# Patient Record
Sex: MALE | Race: WHITE | Employment: OTHER | URBAN - METROPOLITAN AREA
[De-identification: names, ages, dates, MRNs, and addresses within clinical notes are randomized per-mention and may not be internally consistent; named-entity substitution may affect disease eponyms.]

---

## 2018-03-06 PROBLEM — M25.461 EFFUSION OF RIGHT KNEE: Status: ACTIVE | Noted: 2018-03-06

## 2018-07-25 ENCOUNTER — HOSPITAL ENCOUNTER (OUTPATIENT)
Age: 53
Discharge: HOME | End: 2018-07-27
Payer: COMMERCIAL

## 2018-07-25 ENCOUNTER — OFFICE VISIT (OUTPATIENT)
Dept: ORTHOPEDIC SURGERY | Age: 53
End: 2018-07-25
Payer: COMMERCIAL

## 2018-07-25 VITALS
BODY MASS INDEX: 28.49 KG/M2 | HEART RATE: 91 BPM | SYSTOLIC BLOOD PRESSURE: 132 MMHG | TEMPERATURE: 98.6 F | HEIGHT: 73 IN | WEIGHT: 215 LBS | DIASTOLIC BLOOD PRESSURE: 89 MMHG

## 2018-07-25 DIAGNOSIS — M25.461 EFFUSION OF RIGHT KNEE: ICD-10-CM

## 2018-07-25 DIAGNOSIS — G89.29 CHRONIC PAIN OF LEFT KNEE: Primary | ICD-10-CM

## 2018-07-25 DIAGNOSIS — M25.462 EFFUSION OF LEFT KNEE JOINT: ICD-10-CM

## 2018-07-25 DIAGNOSIS — M25.562 CHRONIC PAIN OF LEFT KNEE: Primary | ICD-10-CM

## 2018-07-25 LAB
APPEARANCE FLUID: NORMAL
COLOR FLUID: YELLOW
MONOCYTE, FLUID: 88 %
NEUTROPHIL, FLUID: 12 %
NUCLEATED CELLS FLUID: 3291 /UL
RBC FLUID: <2000 /UL

## 2018-07-25 PROCEDURE — 89060 EXAM SYNOVIAL FLUID CRYSTALS: CPT

## 2018-07-25 PROCEDURE — G8419 CALC BMI OUT NRM PARAM NOF/U: HCPCS | Performed by: ORTHOPAEDIC SURGERY

## 2018-07-25 PROCEDURE — 99213 OFFICE O/P EST LOW 20 MIN: CPT | Performed by: ORTHOPAEDIC SURGERY

## 2018-07-25 PROCEDURE — 89051 BODY FLUID CELL COUNT: CPT

## 2018-07-25 PROCEDURE — G8427 DOCREV CUR MEDS BY ELIG CLIN: HCPCS | Performed by: ORTHOPAEDIC SURGERY

## 2018-07-25 PROCEDURE — 1036F TOBACCO NON-USER: CPT | Performed by: ORTHOPAEDIC SURGERY

## 2018-07-25 PROCEDURE — 3017F COLORECTAL CA SCREEN DOC REV: CPT | Performed by: ORTHOPAEDIC SURGERY

## 2018-07-25 PROCEDURE — 20610 DRAIN/INJ JOINT/BURSA W/O US: CPT | Performed by: ORTHOPAEDIC SURGERY

## 2018-07-25 RX ORDER — TRIAMCINOLONE ACETONIDE 40 MG/ML
80 INJECTION, SUSPENSION INTRA-ARTICULAR; INTRAMUSCULAR ONCE
Status: COMPLETED | OUTPATIENT
Start: 2018-07-25 | End: 2018-07-25

## 2018-07-25 RX ORDER — BUPIVACAINE HYDROCHLORIDE 2.5 MG/ML
3 INJECTION, SOLUTION INFILTRATION; PERINEURAL ONCE
Status: COMPLETED | OUTPATIENT
Start: 2018-07-25 | End: 2018-07-25

## 2018-07-25 RX ADMIN — BUPIVACAINE HYDROCHLORIDE 7.5 MG: 2.5 INJECTION, SOLUTION INFILTRATION; PERINEURAL at 15:36

## 2018-07-25 RX ADMIN — TRIAMCINOLONE ACETONIDE 80 MG: 40 INJECTION, SUSPENSION INTRA-ARTICULAR; INTRAMUSCULAR at 15:36

## 2018-07-26 LAB
CRYSTALS, FLUID: NORMAL
PATH CONSULT FLUID: YES
SOURCE BODY FLUID: NORMAL

## 2018-07-26 NOTE — PROGRESS NOTES
Chief Complaint:   Chief Complaint   Patient presents with    Knee Problem      RJB for TSBc/o Lt knee pain and swelling, reaccumulation of fluid x 2 weeks. Is in golf outing this weekend. Last drained 3/6/2018       HPI 78-year-old man with history of recurring knee effusions more recently on the right knee. Followed by Dr. Lexy Kasper. Aspirations of never demonstrated sepsis. There was question of gout or other inflammatory arthropathy. Patient called today stating he had left knee pain and swelling. Could not wait until tomorrow to see Dr. Chel Aparicio, since he has a golf tournament planned. No recent injury no fever or systemic illness. Patient Active Problem List   Diagnosis    Gout of both knees    Effusion of right knee    Effusion of left knee joint       Past Medical History:   Diagnosis Date    Asthma     seasonal     Gout     Gout     Hyperlipidemia        Past Surgical History:   Procedure Laterality Date    COLONOSCOPY         Current Outpatient Prescriptions   Medication Sig Dispense Refill    atorvastatin (LIPITOR) 80 MG tablet Take 80 mg by mouth daily       fluticasone-salmeterol (ADVAIR) 100-50 MCG/DOSE diskus inhaler Inhale 1 puff into the lungs daily      fluticasone (FLONASE) 50 MCG/ACT nasal spray 1 spray as needed      ibuprofen (ADVIL;MOTRIN) 200 MG tablet Take 200 mg by mouth as needed      ketoconazole (NIZORAL) 2 % shampoo Apply topically daily      predniSONE (DELTASONE) 10 MG tablet 10 mg      LORazepam (ATIVAN) 0.5 MG tablet Take 0.5 mg by mouth as needed  0    allopurinol (ZYLOPRIM) 300 MG tablet Take 300 mg by mouth daily.  aspirin 81 MG EC tablet Take 81 mg by mouth daily.       celecoxib (CELEBREX) 200 MG capsule Take 1 capsule by mouth 2 times daily 60 capsule 2    predniSONE (DELTASONE) 5 MG tablet Take 1 tablet by mouth daily      indomethacin (INDOCIN) 25 MG capsule Take 25 mg by mouth 2 times daily (with meals)      HYDROcodone-acetaminophen (NORCO) 5-325 MG per tablet Take 1 tablet by mouth every 6 hours as needed for Pain      colchicine (COLCRYS) 0.6 MG tablet Take 0.6 mg by mouth as needed      omeprazole (PRILOSEC) 10 MG capsule Take 10 mg by mouth daily.  indomethacin (INDOCIN) 50 MG capsule Take 1 capsule by mouth 3 times daily for 30 doses. 30 capsule 0    HYDROcodone-acetaminophen (CO-GESIC) 5-500 MG per tablet Take 1 tablet by mouth every 6 hours as needed for Pain for 7 doses. 7 tablet 0     No current facility-administered medications for this visit. No Known Allergies    Social History     Social History    Marital status:      Spouse name: N/A    Number of children: N/A    Years of education: N/A     Social History Main Topics    Smoking status: Former Smoker     Quit date: 1984    Smokeless tobacco: Never Used    Alcohol use Yes      Comment: social    Drug use: No    Sexual activity: Not Asked     Other Topics Concern    None     Social History Narrative    None       Family History   Problem Relation Age of Onset    Heart Disease Father 50        had rheumatic fever as child ACB     Cancer Sister 48         breast [place]         Review of Systems   No fever, chills, or other constitutional symptoms. No numbness or other neuro symptoms. Ortho Exam left knee moderate effusion with mild diffuse tenderness no erythema. Patient is not systemically ill. Left knee range of motion is full. There is no pain with left hip rotation. Physical Exam    Patient is alert and oriented. Well-developed well-nourished. Pupils equal and reactive. Sclerae anicteric. Neck supple  Lungs clear. Cardiac rate and rhythm regular. Abdomen soft and nontender. Skin warm and dry. XRAY: deferred                    ASSESSMENT/PLAN:    Brent Deleon was seen today for knee problem.     Diagnoses and all orders for this visit:    Chronic pain of left knee    Effusion of right knee    Effusion of left knee joint  -     MT

## 2019-07-10 ENCOUNTER — OFFICE VISIT (OUTPATIENT)
Dept: ORTHOPEDIC SURGERY | Age: 54
End: 2019-07-10
Payer: COMMERCIAL

## 2019-07-10 VITALS
SYSTOLIC BLOOD PRESSURE: 137 MMHG | HEIGHT: 73 IN | TEMPERATURE: 97.5 F | HEART RATE: 61 BPM | BODY MASS INDEX: 29.16 KG/M2 | WEIGHT: 220 LBS | DIASTOLIC BLOOD PRESSURE: 88 MMHG

## 2019-07-10 DIAGNOSIS — M25.512 LEFT SHOULDER PAIN, UNSPECIFIED CHRONICITY: Primary | ICD-10-CM

## 2019-07-10 PROCEDURE — 99213 OFFICE O/P EST LOW 20 MIN: CPT | Performed by: ORTHOPAEDIC SURGERY

## 2019-07-10 PROCEDURE — 1036F TOBACCO NON-USER: CPT | Performed by: ORTHOPAEDIC SURGERY

## 2019-07-10 PROCEDURE — 3017F COLORECTAL CA SCREEN DOC REV: CPT | Performed by: ORTHOPAEDIC SURGERY

## 2019-07-10 PROCEDURE — G8427 DOCREV CUR MEDS BY ELIG CLIN: HCPCS | Performed by: ORTHOPAEDIC SURGERY

## 2019-07-10 PROCEDURE — G8419 CALC BMI OUT NRM PARAM NOF/U: HCPCS | Performed by: ORTHOPAEDIC SURGERY

## 2019-07-10 RX ORDER — APREMILAST 30 MG/1
TABLET, FILM COATED ORAL 2 TIMES DAILY
COMMUNITY
Start: 2019-06-23

## 2019-07-16 ENCOUNTER — OFFICE VISIT (OUTPATIENT)
Dept: RHEUMATOLOGY | Age: 54
End: 2019-07-16
Payer: COMMERCIAL

## 2019-07-16 VITALS
DIASTOLIC BLOOD PRESSURE: 84 MMHG | BODY MASS INDEX: 29.1 KG/M2 | OXYGEN SATURATION: 98 % | WEIGHT: 220.6 LBS | SYSTOLIC BLOOD PRESSURE: 138 MMHG | TEMPERATURE: 97.8 F | HEART RATE: 98 BPM

## 2019-07-16 DIAGNOSIS — M1A.09X0 IDIOPATHIC CHRONIC GOUT OF MULTIPLE SITES WITHOUT TOPHUS: ICD-10-CM

## 2019-07-16 DIAGNOSIS — L40.50 PSA (PSORIATIC ARTHRITIS) (HCC): ICD-10-CM

## 2019-07-16 PROCEDURE — 99214 OFFICE O/P EST MOD 30 MIN: CPT | Performed by: INTERNAL MEDICINE

## 2019-07-16 SDOH — HEALTH STABILITY: MENTAL HEALTH: HOW MANY STANDARD DRINKS CONTAINING ALCOHOL DO YOU HAVE ON A TYPICAL DAY?: 1 OR 2

## 2019-07-16 SDOH — HEALTH STABILITY: MENTAL HEALTH: HOW OFTEN DO YOU HAVE A DRINK CONTAINING ALCOHOL?: MONTHLY OR LESS

## 2019-07-16 ASSESSMENT — ENCOUNTER SYMPTOMS
ABDOMINAL PAIN: 0
EYE PAIN: 0
EYE DISCHARGE: 0
DIARRHEA: 0
PHOTOPHOBIA: 0
ABDOMINAL DISTENTION: 0
WHEEZING: 0
SORE THROAT: 0
BLOOD IN STOOL: 0
COUGH: 0
COLOR CHANGE: 0
SHORTNESS OF BREATH: 0
CONSTIPATION: 0
TROUBLE SWALLOWING: 0
SINUS PRESSURE: 0
CHEST TIGHTNESS: 0
SINUS PAIN: 0
EYE REDNESS: 0
EYE ITCHING: 0
VOMITING: 0

## 2019-07-16 NOTE — PROGRESS NOTES
heat intolerance, polydipsia, polyphagia and polyuria. Genitourinary: Negative for dysuria, flank pain, genital sores and hematuria. Musculoskeletal: Positive for arthralgias and joint swelling. Negative for myalgias and neck pain. Skin: Positive for rash. Negative for color change, pallor and wound. Allergic/Immunologic: Negative for environmental allergies and food allergies. Neurological: Negative for dizziness, seizures, syncope, weakness, light-headedness, numbness and headaches. Hematological: Negative for adenopathy. Does not bruise/bleed easily. Psychiatric/Behavioral: Negative for confusion. The patient is not nervous/anxious. Current Outpatient Medications:     adalimumab (HUMIRA PEN) 40 MG/0.8ML injection, 40 mg sub Inject every other week -citrate, Disp: 1 each, Rfl: 0    OTEZLA 30 MG TABS, Take by mouth 2 times daily , Disp: , Rfl:     atorvastatin (LIPITOR) 80 MG tablet, Take 80 mg by mouth daily , Disp: , Rfl:     fluticasone-salmeterol (ADVAIR) 100-50 MCG/DOSE diskus inhaler, Inhale 1 puff into the lungs as needed , Disp: , Rfl:     fluticasone (FLONASE) 50 MCG/ACT nasal spray, 1 spray as needed, Disp: , Rfl:     ibuprofen (ADVIL;MOTRIN) 200 MG tablet, Take 200 mg by mouth as needed, Disp: , Rfl:     ketoconazole (NIZORAL) 2 % shampoo, Apply topically daily, Disp: , Rfl:     predniSONE (DELTASONE) 10 MG tablet, 10 mg, Disp: , Rfl:     LORazepam (ATIVAN) 0.5 MG tablet, Take 0.5 mg by mouth as needed, Disp: , Rfl: 0    allopurinol (ZYLOPRIM) 300 MG tablet, Take 300 mg by mouth daily. , Disp: , Rfl:     aspirin 81 MG EC tablet, Take 81 mg by mouth daily. , Disp: , Rfl:     omeprazole (PRILOSEC) 10 MG capsule, Take 10 mg by mouth daily. , Disp: , Rfl:     indomethacin (INDOCIN) 50 MG capsule, Take 1 capsule by mouth 3 times daily for 30 doses. , Disp: 30 capsule, Rfl: 0  No Known Allergies        Past Medical History:   Diagnosis Date    Asthma     seasonal     Gout History Narrative    Not on file        Vitals:    07/16/19 1305   BP: 138/84   Site: Left Upper Arm   Position: Sitting   Pulse: 98   Temp: 97.8 °F (36.6 °C)   TempSrc: Temporal   SpO2: 98%   Weight: 220 lb 9.6 oz (100.1 kg)        Physical Exam   Constitutional: He is oriented to person, place, and time. He appears well-developed and well-nourished. HENT:   Head: Normocephalic. Right Ear: External ear normal.   Left Ear: External ear normal.   Mouth/Throat: Oropharynx is clear and moist.   Eyes: Pupils are equal, round, and reactive to light. Conjunctivae and EOM are normal.   Neck: Normal range of motion. No thyromegaly present. Cardiovascular: Normal rate, regular rhythm and intact distal pulses. Pulmonary/Chest: Effort normal and breath sounds normal. He has no wheezes. He has no rales. He exhibits no tenderness. Abdominal: Soft. Bowel sounds are normal. He exhibits no distension and no mass. There is no tenderness. There is no rebound and no guarding. Musculoskeletal: Normal range of motion. TTP left wrist   TTP most of MCP   Lymphadenopathy:     He has no cervical adenopathy. Neurological: He is alert and oriented to person, place, and time. Skin: Skin is warm and dry. Capillary refill takes less than 2 seconds. No rash noted. Nail pitting   Elevation of nail plates  Scaly rash over bilateral; shin. Psychiatric: He has a normal mood and affect. His behavior is normal.        Rey Mejía was seen today for joint pain, joint swelling and discuss medications. Diagnoses and all orders for this visit:    PSA (psoriatic arthritis) ([facility] [address]  Arthropathic psoriasis, unspecified  Comments : PsA manifested as oligoarthritis, nail pitting, along with worsening of Psoriatic patches. Significant improvement of symptoms since he is on [place]. At this point he has mild swelling in left knee and pain in left wrist and right shoulder. Today I discussed about Humira.  Reviewed side effects including but not

## 2019-07-23 ENCOUNTER — TELEPHONE (OUTPATIENT)
Dept: RHEUMATOLOGY | Age: 54
End: 2019-07-23

## 2019-07-30 ENCOUNTER — OFFICE VISIT (OUTPATIENT)
Dept: ORTHOPEDIC SURGERY | Age: 54
End: 2019-07-30
Payer: COMMERCIAL

## 2019-07-30 VITALS
BODY MASS INDEX: 29.16 KG/M2 | SYSTOLIC BLOOD PRESSURE: 122 MMHG | HEART RATE: 63 BPM | HEIGHT: 73 IN | DIASTOLIC BLOOD PRESSURE: 77 MMHG | TEMPERATURE: 98.4 F | WEIGHT: 220 LBS

## 2019-07-30 DIAGNOSIS — M25.512 LEFT SHOULDER PAIN, UNSPECIFIED CHRONICITY: Primary | ICD-10-CM

## 2019-07-30 PROCEDURE — 1036F TOBACCO NON-USER: CPT | Performed by: ORTHOPAEDIC SURGERY

## 2019-07-30 PROCEDURE — G8427 DOCREV CUR MEDS BY ELIG CLIN: HCPCS | Performed by: ORTHOPAEDIC SURGERY

## 2019-07-30 PROCEDURE — 20610 DRAIN/INJ JOINT/BURSA W/O US: CPT | Performed by: ORTHOPAEDIC SURGERY

## 2019-07-30 PROCEDURE — G8419 CALC BMI OUT NRM PARAM NOF/U: HCPCS | Performed by: ORTHOPAEDIC SURGERY

## 2019-07-30 PROCEDURE — 3017F COLORECTAL CA SCREEN DOC REV: CPT | Performed by: ORTHOPAEDIC SURGERY

## 2019-07-30 PROCEDURE — 99212 OFFICE O/P EST SF 10 MIN: CPT | Performed by: ORTHOPAEDIC SURGERY

## 2019-07-30 RX ORDER — TRIAMCINOLONE ACETONIDE 40 MG/ML
40 INJECTION, SUSPENSION INTRA-ARTICULAR; INTRAMUSCULAR ONCE
Status: COMPLETED | OUTPATIENT
Start: 2019-07-30 | End: 2019-07-30

## 2019-07-30 RX ORDER — LIDOCAINE HYDROCHLORIDE 10 MG/ML
2 INJECTION, SOLUTION INFILTRATION; PERINEURAL ONCE
Status: COMPLETED | OUTPATIENT
Start: 2019-07-30 | End: 2019-07-30

## 2019-07-30 RX ADMIN — TRIAMCINOLONE ACETONIDE 40 MG: 40 INJECTION, SUSPENSION INTRA-ARTICULAR; INTRAMUSCULAR at 13:36

## 2019-07-30 RX ADMIN — LIDOCAINE HYDROCHLORIDE 2 ML: 10 INJECTION, SOLUTION INFILTRATION; PERINEURAL at 13:35

## 2019-12-17 ENCOUNTER — OFFICE VISIT (OUTPATIENT)
Dept: FAMILY MEDICINE CLINIC | Age: 54
End: 2019-12-17
Payer: COMMERCIAL

## 2019-12-17 VITALS
HEIGHT: 73 IN | TEMPERATURE: 97.6 F | DIASTOLIC BLOOD PRESSURE: 84 MMHG | BODY MASS INDEX: 29.72 KG/M2 | HEART RATE: 61 BPM | SYSTOLIC BLOOD PRESSURE: 130 MMHG | OXYGEN SATURATION: 98 % | WEIGHT: 224.2 LBS | RESPIRATION RATE: 18 BRPM

## 2019-12-17 DIAGNOSIS — R07.0 PAIN IN THROAT: ICD-10-CM

## 2019-12-17 DIAGNOSIS — J01.90 ACUTE NON-RECURRENT SINUSITIS, UNSPECIFIED LOCATION: Primary | ICD-10-CM

## 2019-12-17 DIAGNOSIS — H66.91 RIGHT OTITIS MEDIA, UNSPECIFIED OTITIS MEDIA TYPE: ICD-10-CM

## 2019-12-17 DIAGNOSIS — R09.82 POSTNASAL DRIP: ICD-10-CM

## 2019-12-17 PROCEDURE — G8419 CALC BMI OUT NRM PARAM NOF/U: HCPCS | Performed by: PHYSICIAN ASSISTANT

## 2019-12-17 PROCEDURE — G8484 FLU IMMUNIZE NO ADMIN: HCPCS | Performed by: PHYSICIAN ASSISTANT

## 2019-12-17 PROCEDURE — G8427 DOCREV CUR MEDS BY ELIG CLIN: HCPCS | Performed by: PHYSICIAN ASSISTANT

## 2019-12-17 PROCEDURE — 99214 OFFICE O/P EST MOD 30 MIN: CPT | Performed by: PHYSICIAN ASSISTANT

## 2019-12-17 PROCEDURE — 3017F COLORECTAL CA SCREEN DOC REV: CPT | Performed by: PHYSICIAN ASSISTANT

## 2019-12-17 PROCEDURE — 96372 THER/PROPH/DIAG INJ SC/IM: CPT | Performed by: PHYSICIAN ASSISTANT

## 2019-12-17 PROCEDURE — 1036F TOBACCO NON-USER: CPT | Performed by: PHYSICIAN ASSISTANT

## 2019-12-17 RX ORDER — CEFDINIR 300 MG/1
300 CAPSULE ORAL 2 TIMES DAILY
Qty: 20 CAPSULE | Refills: 0 | Status: SHIPPED | OUTPATIENT
Start: 2019-12-17 | End: 2019-12-27

## 2019-12-17 RX ORDER — METHYLPREDNISOLONE 4 MG/1
TABLET ORAL
Qty: 1 KIT | Refills: 0 | Status: SHIPPED
Start: 2019-12-17 | End: 2020-02-18 | Stop reason: ALTCHOICE

## 2019-12-17 RX ORDER — METHYLPREDNISOLONE ACETATE 40 MG/ML
40 INJECTION, SUSPENSION INTRA-ARTICULAR; INTRALESIONAL; INTRAMUSCULAR; SOFT TISSUE ONCE
Status: COMPLETED | OUTPATIENT
Start: 2019-12-17 | End: 2019-12-17

## 2019-12-17 RX ADMIN — METHYLPREDNISOLONE ACETATE 40 MG: 40 INJECTION, SUSPENSION INTRA-ARTICULAR; INTRALESIONAL; INTRAMUSCULAR; SOFT TISSUE at 09:25

## 2020-01-07 ENCOUNTER — OFFICE VISIT (OUTPATIENT)
Dept: ORTHOPEDIC SURGERY | Age: 55
End: 2020-01-07
Payer: COMMERCIAL

## 2020-01-07 VITALS — HEIGHT: 73 IN | WEIGHT: 220 LBS | BODY MASS INDEX: 29.16 KG/M2

## 2020-01-07 PROCEDURE — G8484 FLU IMMUNIZE NO ADMIN: HCPCS | Performed by: ORTHOPAEDIC SURGERY

## 2020-01-07 PROCEDURE — 99213 OFFICE O/P EST LOW 20 MIN: CPT | Performed by: ORTHOPAEDIC SURGERY

## 2020-01-07 PROCEDURE — 1036F TOBACCO NON-USER: CPT | Performed by: ORTHOPAEDIC SURGERY

## 2020-01-07 PROCEDURE — G8419 CALC BMI OUT NRM PARAM NOF/U: HCPCS | Performed by: ORTHOPAEDIC SURGERY

## 2020-01-07 PROCEDURE — G8427 DOCREV CUR MEDS BY ELIG CLIN: HCPCS | Performed by: ORTHOPAEDIC SURGERY

## 2020-01-07 PROCEDURE — 20610 DRAIN/INJ JOINT/BURSA W/O US: CPT | Performed by: ORTHOPAEDIC SURGERY

## 2020-01-07 PROCEDURE — 3017F COLORECTAL CA SCREEN DOC REV: CPT | Performed by: ORTHOPAEDIC SURGERY

## 2020-01-07 RX ORDER — TRIAMCINOLONE ACETONIDE 40 MG/ML
80 INJECTION, SUSPENSION INTRA-ARTICULAR; INTRAMUSCULAR ONCE
Status: COMPLETED | OUTPATIENT
Start: 2020-01-07 | End: 2020-01-07

## 2020-01-07 RX ORDER — BUPIVACAINE HYDROCHLORIDE 2.5 MG/ML
3 INJECTION, SOLUTION INFILTRATION; PERINEURAL ONCE
Status: COMPLETED | OUTPATIENT
Start: 2020-01-07 | End: 2020-01-07

## 2020-01-07 RX ADMIN — BUPIVACAINE HYDROCHLORIDE 7.5 MG: 2.5 INJECTION, SOLUTION INFILTRATION; PERINEURAL at 13:11

## 2020-01-07 RX ADMIN — TRIAMCINOLONE ACETONIDE 80 MG: 40 INJECTION, SUSPENSION INTRA-ARTICULAR; INTRAMUSCULAR at 13:12

## 2020-01-07 NOTE — PROGRESS NOTES
Tobacco Use    Smoking status: Former Smoker     Packs/day: 1.00     Years: 12.00     Pack years: 12.00     Types: Cigarettes     Last attempt to quit: 1984     Years since quittin.0    Smokeless tobacco: Never Used   Substance and Sexual Activity    Alcohol use: Yes     Frequency: Monthly or less     Drinks per session: 1 or 2     Binge frequency: Never     Comment: social    Drug use: Never    Sexual activity: Yes     Partners: Female   Lifestyle    Physical activity:     Days per week: None     Minutes per session: None    Stress: None   Relationships    Social connections:     Talks on phone: None     Gets together: None     Attends Protestant service: None     Active member of club or organization: None     Attends meetings of clubs or organizations: None     Relationship status: None    Intimate partner violence:     Fear of current or ex partner: None     Emotionally abused: None     Physically abused: None     Forced sexual activity: None   Other Topics Concern    None   Social History Narrative    None       Family History   Problem Relation Age of Onset    Heart Disease Father 50        had rheumatic fever as child ACB     Cancer Sister 48         breast [place]         Review of Systems  As follows except as previously noted in HPI:  Constitutional: Negative for chills, diaphoresis, fatigue, fever and unexpected weight change. Respiratory: Negative for cough, shortness of breath and wheezing. Cardiovascular: Negative for chest pain and palpitations. Neurological: Negative for dizziness, syncope, cephalgia. GI / : negative  Musculoskeletal: see HPI       Objective:   Physical Exam   Constitutional: Oriented to person, place, and time. and appears well-developed and well-nourished. :   Head: Normocephalic and atraumatic. Eyes: EOM are normal.   Neck: Neck supple. Cardiovascular: Normal rate and regular rhythm. Pulmonary/Chest: Effort normal. No stridor.  No

## 2020-01-20 ENCOUNTER — TELEPHONE (OUTPATIENT)
Dept: ORTHOPEDIC SURGERY | Age: 55
End: 2020-01-20

## 2020-01-21 NOTE — TELEPHONE ENCOUNTER
1/21/2020 1:25 pm Follow up phone call / spoke with and informed patient of TSB's response. Patient replied: \"My knee is hurting me - bad. \" \"Then schedule knee surgery. \" \"Next week if possible or anytime, I am self-employed, I will make time. \" Informed patient: Will send message to B / Will call back with his response.

## 2020-02-11 ENCOUNTER — HOSPITAL ENCOUNTER (OUTPATIENT)
Dept: MRI IMAGING | Age: 55
Discharge: HOME | End: 2020-02-13
Payer: COMMERCIAL

## 2020-02-11 PROCEDURE — 73721 MRI JNT OF LWR EXTRE W/O DYE: CPT

## 2020-02-12 ENCOUNTER — TELEPHONE (OUTPATIENT)
Dept: ORTHOPEDIC SURGERY | Age: 55
End: 2020-02-12

## 2020-02-12 NOTE — TELEPHONE ENCOUNTER
2/12/2020 2:30 pm Per TSB: Phoned and spoke with patient / Informed patient of TSB's response. Patient states, \"I just can't understand why my knee hurts so bad. \" Appointment for Tuesday, February 18 th at 11:30 am given to patient - discuss results of MRI

## 2020-02-18 ENCOUNTER — OFFICE VISIT (OUTPATIENT)
Dept: ORTHOPEDIC SURGERY | Age: 55
End: 2020-02-18
Payer: COMMERCIAL

## 2020-02-18 ENCOUNTER — HOSPITAL ENCOUNTER (OUTPATIENT)
Age: 55
Discharge: HOME | End: 2020-02-20
Payer: COMMERCIAL

## 2020-02-18 VITALS
TEMPERATURE: 98.1 F | HEART RATE: 64 BPM | DIASTOLIC BLOOD PRESSURE: 76 MMHG | SYSTOLIC BLOOD PRESSURE: 126 MMHG | BODY MASS INDEX: 29.16 KG/M2 | HEIGHT: 73 IN | WEIGHT: 220 LBS

## 2020-02-18 LAB
APPEARANCE FLUID: NORMAL
CELL COUNT FLUID TYPE: NORMAL
COLOR FLUID: YELLOW
MONOCYTE, FLUID: 83 %
NEUTROPHIL, FLUID: 17 %
NUCLEATED CELLS FLUID: 4642 /UL
RBC FLUID: <2000 /UL

## 2020-02-18 PROCEDURE — G8427 DOCREV CUR MEDS BY ELIG CLIN: HCPCS | Performed by: ORTHOPAEDIC SURGERY

## 2020-02-18 PROCEDURE — 89051 BODY FLUID CELL COUNT: CPT

## 2020-02-18 PROCEDURE — 1036F TOBACCO NON-USER: CPT | Performed by: ORTHOPAEDIC SURGERY

## 2020-02-18 PROCEDURE — G8419 CALC BMI OUT NRM PARAM NOF/U: HCPCS | Performed by: ORTHOPAEDIC SURGERY

## 2020-02-18 PROCEDURE — G8484 FLU IMMUNIZE NO ADMIN: HCPCS | Performed by: ORTHOPAEDIC SURGERY

## 2020-02-18 PROCEDURE — 99213 OFFICE O/P EST LOW 20 MIN: CPT | Performed by: ORTHOPAEDIC SURGERY

## 2020-02-18 PROCEDURE — 20610 DRAIN/INJ JOINT/BURSA W/O US: CPT | Performed by: ORTHOPAEDIC SURGERY

## 2020-02-18 PROCEDURE — 89060 EXAM SYNOVIAL FLUID CRYSTALS: CPT

## 2020-02-18 PROCEDURE — 3017F COLORECTAL CA SCREEN DOC REV: CPT | Performed by: ORTHOPAEDIC SURGERY

## 2020-02-18 NOTE — PROGRESS NOTES
Chief Complaint:   Chief Complaint   Patient presents with    Knee Pain     F/u right knee. Continues to have pain and swelling. Here to go over MRI results. Ashwin Denney presents with persisting right knee pain, was relieved for a very short time following the steroid injection last visit. Recently he has developed recurring effusions as well with which she is having more diffuse pain in the knee interfering with activities especially those involving weightbearing flexion and transfers. No new injury no other joint complaints, no fever chills sweats or other systemic symptom. Allergies; medications; past medical, surgical, family, and social history; and problem list have been reviewed today and updated as indicated in this encounter seen below. Exam: Leg lengths equal hip motion painless left knee benign. Right knee shows a moderate but benign-appearing effusion today nonerythematous minimally warm diffusely tender. Extensor mechanism is intact, the knee shows range of motion 0-1 25 with no medial lateral AP laxity. No edema distally. No synovitis seen in the ankles or the upper extremities. Radiographs: MRI report of the right knee shows intact ligamentous and meniscal structures, there is an area of hyperemia in the medial femoral condyle without collapse which could be consistent with spontaneous osteonecrosis. Review of recent x-rays from last office visit showed no sign of that and joint spaces were preserved. Fan Cee was seen today for knee pain. Diagnoses and all orders for this visit:    Effusion of right knee  -     ME ARTHROCENTESIS ASPIR&/INJ MAJOR JT/BURSA W/O US  -     Synovial fluid, crystal; Future  -     Body Fluid Cell Count with Differential; Future    Pain and swelling of right knee       At this point the etiology of the effusion is uncertain given the lack of evidence for internal derangement per se.   This may be a recurrence of his underlying autoimmune inflammatory condition versus early osteonecrosis. Neither of these would respond to arthroscopic intervention, which I discussed with the patient. Given his effusion and discomfort though I did aspirate the right knee through a lateral approach sterile technique local anesthesia no difficulty or complication 60 mL of yellow very slightly cloudy synovial fluid was obtained and this was sent for crystals and CBC differential.  Patient is following up with his rheumatologist in the next week he will ask them to review these results as well in care everywhere. Questions asked and answered follow-up in 6 weeks for repeat clinical exam and x-rays weightbearing right knee including flexion view. - Counseling More Than 50% of the Appointment Time: 15 minutes    Return in about 6 weeks (around 3/31/2020) for xray. Current Outpatient Medications   Medication Sig Dispense Refill    OTEZLA 30 MG TABS Take by mouth 2 times daily       atorvastatin (LIPITOR) 80 MG tablet Take 80 mg by mouth daily       fluticasone (FLONASE) 50 MCG/ACT nasal spray 1 spray as needed      ibuprofen (ADVIL;MOTRIN) 200 MG tablet Take 200 mg by mouth as needed      ketoconazole (NIZORAL) 2 % shampoo Apply topically daily      LORazepam (ATIVAN) 0.5 MG tablet Take 0.5 mg by mouth as needed  0    allopurinol (ZYLOPRIM) 300 MG tablet Take 300 mg by mouth daily. No current facility-administered medications for this visit.         Patient Active Problem List   Diagnosis    Gout of both knees    Effusion of right knee    Effusion of left knee joint    PSA (psoriatic arthritis) ([facility] [address]    Chronic idiopathic gout of multiple sites       Past Medical History:   Diagnosis Date    Asthma     seasonal     Gout     Gout     Hyperlipidemia        Past Surgical History:   Procedure Laterality Date    COLONOSCOPY         No Known Allergies    Social History     Socioeconomic History    Marital status:      Spouse name: None    Number of children: None    Years of education: None    Highest education level: None   Occupational History     Employer: SELF-EMPLOYED   Social Needs    Financial resource strain: None    Food insecurity:     Worry: None     Inability: None    Transportation needs:     Medical: None     Non-medical: None   Tobacco Use    Smoking status: Former Smoker     Packs/day: 1.00     Years: 12.00     Pack years: 12.00     Types: Cigarettes     Last attempt to quit: 1984     Years since quittin.1    Smokeless tobacco: Never Used   Substance and Sexual Activity    Alcohol use: Yes     Frequency: Monthly or less     Drinks per session: 1 or 2     Binge frequency: Never     Comment: social    Drug use: Never    Sexual activity: Yes     Partners: Female   Lifestyle    Physical activity:     Days per week: None     Minutes per session: None    Stress: None   Relationships    Social connections:     Talks on phone: None     Gets together: None     Attends Episcopal service: None     Active member of club or organization: None     Attends meetings of clubs or organizations: None     Relationship status: None    Intimate partner violence:     Fear of current or ex partner: None     Emotionally abused: None     Physically abused: None     Forced sexual activity: None   Other Topics Concern    None   Social History Narrative    None       Family History   Problem Relation Age of Onset    Heart Disease Father 50        had rheumatic fever as child ACB     Cancer Sister 48         breast [place]         Review of Systems  As follows except as previously noted in HPI:  Constitutional: Negative for chills, diaphoresis, fatigue, fever and unexpected weight change. Respiratory: Negative for cough, shortness of breath and wheezing. Cardiovascular: Negative for chest pain and palpitations. Neurological: Negative for dizziness, syncope, cephalgia.   GI / : negative  Musculoskeletal: see HPI

## 2020-02-20 LAB
CRYSTALS, FLUID: NORMAL
SOURCE BODY FLUID: NORMAL

## 2020-06-27 ENCOUNTER — HOSPITAL ENCOUNTER (EMERGENCY)
Age: 55
Discharge: HOME | End: 2020-06-27
Payer: COMMERCIAL

## 2020-06-27 VITALS
OXYGEN SATURATION: 96 % | DIASTOLIC BLOOD PRESSURE: 92 MMHG | TEMPERATURE: 96.6 F | RESPIRATION RATE: 16 BRPM | HEART RATE: 90 BPM | BODY MASS INDEX: 29.16 KG/M2 | WEIGHT: 220 LBS | SYSTOLIC BLOOD PRESSURE: 150 MMHG | HEIGHT: 73 IN

## 2020-06-27 PROCEDURE — 90471 IMMUNIZATION ADMIN: CPT | Performed by: NURSE PRACTITIONER

## 2020-06-27 PROCEDURE — 2500000003 HC RX 250 WO HCPCS: Performed by: NURSE PRACTITIONER

## 2020-06-27 PROCEDURE — 6370000000 HC RX 637 (ALT 250 FOR IP): Performed by: NURSE PRACTITIONER

## 2020-06-27 PROCEDURE — 99283 EMERGENCY DEPT VISIT LOW MDM: CPT

## 2020-06-27 PROCEDURE — 90715 TDAP VACCINE 7 YRS/> IM: CPT | Performed by: NURSE PRACTITIONER

## 2020-06-27 PROCEDURE — 6360000002 HC RX W HCPCS: Performed by: NURSE PRACTITIONER

## 2020-06-27 RX ORDER — LIDOCAINE HYDROCHLORIDE 10 MG/ML
5 INJECTION, SOLUTION INFILTRATION; PERINEURAL ONCE
Status: COMPLETED | OUTPATIENT
Start: 2020-06-27 | End: 2020-06-27

## 2020-06-27 RX ORDER — DIAPER,BRIEF,INFANT-TODD,DISP
EACH MISCELLANEOUS ONCE
Status: COMPLETED | OUTPATIENT
Start: 2020-06-27 | End: 2020-06-27

## 2020-06-27 RX ADMIN — TETANUS TOXOID, REDUCED DIPHTHERIA TOXOID AND ACELLULAR PERTUSSIS VACCINE, ADSORBED 0.5 ML: 5; 2.5; 8; 8; 2.5 SUSPENSION INTRAMUSCULAR at 17:26

## 2020-06-27 RX ADMIN — LIDOCAINE HYDROCHLORIDE 5 ML: 10 INJECTION, SOLUTION INFILTRATION; PERINEURAL at 17:25

## 2020-06-27 RX ADMIN — BACITRACIN ZINC: 500 OINTMENT TOPICAL at 17:26

## 2020-06-27 ASSESSMENT — PAIN SCALES - GENERAL
PAINLEVEL_OUTOF10: 6
PAINLEVEL_OUTOF10: 6

## 2020-06-27 ASSESSMENT — PAIN DESCRIPTION - LOCATION: LOCATION: HAND

## 2020-06-27 ASSESSMENT — PAIN DESCRIPTION - PAIN TYPE: TYPE: ACUTE PAIN

## 2020-06-27 ASSESSMENT — PAIN DESCRIPTION - ORIENTATION: ORIENTATION: LEFT

## 2020-06-27 NOTE — ED NOTES
No difficulty with movement, sensation, or strength to fingers left hand.      Vicky Bird RN  06/27/20 8227

## 2021-01-11 ENCOUNTER — HOSPITAL ENCOUNTER (EMERGENCY)
Age: 56
Discharge: HOME | End: 2021-01-11
Payer: COMMERCIAL

## 2021-01-11 VITALS
BODY MASS INDEX: 29.82 KG/M2 | HEART RATE: 92 BPM | SYSTOLIC BLOOD PRESSURE: 148 MMHG | OXYGEN SATURATION: 97 % | DIASTOLIC BLOOD PRESSURE: 89 MMHG | RESPIRATION RATE: 16 BRPM | HEIGHT: 73 IN | WEIGHT: 225 LBS | TEMPERATURE: 97.9 F

## 2021-01-11 DIAGNOSIS — S61.012A LACERATION OF LEFT THUMB WITHOUT FOREIGN BODY WITHOUT DAMAGE TO NAIL, INITIAL ENCOUNTER: Primary | ICD-10-CM

## 2021-01-11 PROCEDURE — 12001 RPR S/N/AX/GEN/TRNK 2.5CM/<: CPT

## 2021-01-11 PROCEDURE — 2500000003 HC RX 250 WO HCPCS: Performed by: NURSE PRACTITIONER

## 2021-01-11 PROCEDURE — 6370000000 HC RX 637 (ALT 250 FOR IP): Performed by: NURSE PRACTITIONER

## 2021-01-11 PROCEDURE — 99283 EMERGENCY DEPT VISIT LOW MDM: CPT

## 2021-01-11 RX ORDER — CEPHALEXIN 500 MG/1
500 CAPSULE ORAL 3 TIMES DAILY
Qty: 21 CAPSULE | Refills: 0 | Status: SHIPPED | OUTPATIENT
Start: 2021-01-11 | End: 2021-01-18

## 2021-01-11 RX ORDER — DIAPER,BRIEF,INFANT-TODD,DISP
EACH MISCELLANEOUS ONCE
Status: COMPLETED | OUTPATIENT
Start: 2021-01-11 | End: 2021-01-11

## 2021-01-11 RX ORDER — LIDOCAINE HYDROCHLORIDE 10 MG/ML
5 INJECTION, SOLUTION INFILTRATION; PERINEURAL ONCE
Status: COMPLETED | OUTPATIENT
Start: 2021-01-11 | End: 2021-01-11

## 2021-01-11 RX ADMIN — LIDOCAINE HYDROCHLORIDE 5 ML: 10 INJECTION, SOLUTION INFILTRATION; PERINEURAL at 12:12

## 2021-01-11 RX ADMIN — BACITRACIN ZINC: 500 OINTMENT TOPICAL at 12:12

## 2021-01-11 NOTE — ED PROVIDER NOTES
(1.854 m) 225 lb (102.1 kg)         Constitutional:  Alert, development consistent with age. HEENT:  NC/NT. Airway patent. Neck:  Normal ROM. Supple. Non-tender. Digits:   Right Thumb between the MCP and IP joint. Lateral aspect            Tenderness:  mild. .              Swelling: None. Deformity: no.             ROM: full range of motion. Skin: 2.5 cm laceration present with no active bleeding or surrounding erythema. No drainage. No streaking. Neurovascular: Motor deficit: none. Sensory deficit: none. Pulse deficit: none. Capillary refill: normal.  Hand:              Tenderness:  none. Swelling: None. Deformity: no.             Skin:  no erythema, rash or wounds noted. No neurovascular deficits. No motor or sensory deficits. Compartments soft and compressible. Lymphatics: No lymphangitis or adenopathy noted. Neurological:  Oriented. Motor functions intact. Lab / Imaging Results   (All laboratory and radiology results have been personally reviewed by myself)  Labs:  No results found for this visit on 01/11/21. Imaging: All Radiology results interpreted by Radiologist unless otherwise noted. No orders to display     ED Course / Medical Decision Making     Medications   lidocaine 1 % injection 5 mL (5 mLs Intradermal Given 1/11/21 1212)   bacitracin zinc ointment ( Topical Given 1/11/21 1212)        Consult(s):   None    Procedure(s):   PROCEDURE NOTE  1/11/21       LACERATION REPAIR  Risks, benefits and alternatives (for applicable procedures below) described. Performed By: MARTY Flores CNP. Laceration #: 1. Location: right thumb  Length:  2.5 cm. The wound area was irrigated with sterile saline, cleansend with shur-clens and draped in a sterile fashion. Local Anesthesia:  Lidocaine 1% without epinephrine.   The wound was explored with the following results:  no foreign body or tendon injury seen. Debridement: None. Undermining: None. Wound Margins Revised: None. Flaps Aligned: no. The wound was closed with 4-0 Ethilon using interrupted sutures. Dressing:  bacitracin, a sterile dressing and a bandage. There were no additional wounds requiring formal closure. Total number suture:  5. MDM:   Sonia Bailon is a 69-year-old male who presented to the emergency department for complaint of laceration to right thumb. He reported being right-hand dominant. He stated that the incident occurred approximately 1 hour prior to his arrival and he cut his finger on a cheese knife. No risk for foreign body. His tetanus was up-to-date. Laceration was repaired as stated above. No neurovascular deficits. No motor or sensory deficits. Compartments soft and compressible. Wound care provided and wound care instructions discussed. Patient verbalized understanding. Due to being on Humira he was prescribed Keflex and given strict wound care instructions. He verbalized understanding. He remained hemodynamically stable, nontoxic, and appropriate for outpatient management. He was instructed to follow-up with his PCP for wound care check and suture removal.  Advised to return for any new, change, worsening symptoms or concerns. At this time the patient is without objective evidence of an acute process requiring hospitalization or inpatient management. They have remained hemodynamically stable throughout their entire ED visit and are stable for discharge with outpatient follow-up. The plan has been discussed in detail and they are aware of the specific conditions for emergent return, as well as the importance of follow-up. Plan of Care/Counseling:  I reviewed today's visit with the patient in addition to providing specific details for the plan of care and counseling regarding the diagnosis and prognosis.   Questions are answered at this time and are agreeable with the plan.    Assessment     1. Laceration of left thumb without foreign body without damage to nail, initial encounter      Plan   Discharged home. Patient condition is good    New Medications     New Prescriptions    CEPHALEXIN (KEFLEX) 500 MG CAPSULE    Take 1 capsule by mouth 3 times daily for 7 days     Electronically signed by MARTY Rivera CNP   DD: 1/11/21  **This report was transcribed using voice recognition software. Every effort was made to ensure accuracy; however, inadvertent computerized transcription errors may be present.   END OF ED PROVIDER NOTE      MARTY Rivera CNP  01/11/21 [id number]

## 2021-07-28 ENCOUNTER — OFFICE VISIT (OUTPATIENT)
Dept: ORTHOPEDIC SURGERY | Age: 56
End: 2021-07-28

## 2021-07-28 VITALS — WEIGHT: 225 LBS | HEIGHT: 73 IN | BODY MASS INDEX: 29.82 KG/M2

## 2021-07-28 DIAGNOSIS — L40.50 PSA (PSORIATIC ARTHRITIS) (HCC): ICD-10-CM

## 2021-07-28 DIAGNOSIS — M25.462 EFFUSION OF LEFT KNEE JOINT: ICD-10-CM

## 2021-07-28 DIAGNOSIS — M25.562 PAIN AND SWELLING OF LEFT KNEE: Primary | ICD-10-CM

## 2021-07-28 DIAGNOSIS — M25.462 PAIN AND SWELLING OF LEFT KNEE: Primary | ICD-10-CM

## 2021-07-28 PROCEDURE — 1036F TOBACCO NON-USER: CPT | Performed by: ORTHOPAEDIC SURGERY

## 2021-07-28 PROCEDURE — 99213 OFFICE O/P EST LOW 20 MIN: CPT | Performed by: ORTHOPAEDIC SURGERY

## 2021-07-28 PROCEDURE — G8419 CALC BMI OUT NRM PARAM NOF/U: HCPCS | Performed by: ORTHOPAEDIC SURGERY

## 2021-07-28 PROCEDURE — G8427 DOCREV CUR MEDS BY ELIG CLIN: HCPCS | Performed by: ORTHOPAEDIC SURGERY

## 2021-07-28 PROCEDURE — 3017F COLORECTAL CA SCREEN DOC REV: CPT | Performed by: ORTHOPAEDIC SURGERY

## 2021-07-28 RX ORDER — OMEPRAZOLE 20 MG/1
1 CAPSULE, DELAYED RELEASE ORAL DAILY
COMMUNITY

## 2021-07-28 NOTE — PROGRESS NOTES
Chief Complaint:   Chief Complaint   Patient presents with    Knee Pain     Swelling left knee x 3 weeks. David Chavez presents with acute recurring swelling of his left knee a few weeks ago, no new injury, he has been managing it with rest icing and ibuprofen and is feeling much better although some swelling and pain does persist.  He has been diagnosed with psoriatic arthritis and last year was placed on Humira which well improved his skin he had other side effects from it so he recently discontinued that. He continues on allopurinol. He has no other joint complaints, right knee doing well. No fever chills sweats or other systemic symptom. Allergies; medications; past medical, surgical, family, and social history; and problem list have been reviewed today and updated as indicated in this encounter seen below. Exam: Leg lengths equal hip motion painless right knee very benign today, left knee does show some synovitis and may be a mild to moderate but benign-appearing effusion without erythema minimal warmth, knee remains straight and stable to stress testing through full range of motion although uncomfortable in deep flexion. There are numerous patchy areas of psoriatic type skin lesions diffusely about bilateral lower extremities. Radiographs: Weightbearing AP x-rays of the knees and lateral of the left were obtained today these were compared to previous films, his knees continue to be well aligned there is no evidence of joint space narrowing sclerosis or periarticular erosions, interpreted as normal.    Noah Sahni was seen today for knee pain.     Diagnoses and all orders for this visit:    Pain and swelling of left knee  -     XR KNEE BILATERAL STANDING  -     XR KNEE LEFT (1-2 VIEWS)    PSA (psoriatic arthritis) (HCC)    Effusion of left knee joint       I had an extensive discussion with the patient regarding his diagnosis and treatment alternatives, his flares are likely due to his psoriatic arthritis and we never did find crystals on any of his previous aspirations which does raise some question as to whether gout is involved or not. Since he is off the Humira his psoriasis has flared somewhat, he was encouraged to discuss this with his rheumatologist and his primary care physician. We talked about injection to his left knee but he is feeling better so he deferred it for now but if knee pain or swelling persist or worsen he will come back for aspiration and injection. Return if symptoms worsen or fail to improve. Current Outpatient Medications   Medication Sig Dispense Refill    omeprazole (PRILOSEC) 20 MG delayed release capsule Take 1 capsule by mouth daily      atorvastatin (LIPITOR) 80 MG tablet Take 80 mg by mouth daily       fluticasone (FLONASE) 50 MCG/ACT nasal spray 1 spray as needed      ibuprofen (ADVIL;MOTRIN) 200 MG tablet Take 200 mg by mouth as needed      ketoconazole (NIZORAL) 2 % shampoo Apply topically daily      LORazepam (ATIVAN) 0.5 MG tablet Take 0.5 mg by mouth as needed  0    allopurinol (ZYLOPRIM) 300 MG tablet Take 300 mg by mouth daily.  adalimumab (HUMIRA) 40 MG/0.8ML injection Inject 40 mg into the skin every 14 days (Patient not taking: Reported on 7/28/2021)      OTEZLA 30 MG TABS Take by mouth 2 times daily  (Patient not taking: Reported on 7/28/2021)       No current facility-administered medications for this visit.        Patient Active Problem List   Diagnosis    Gout of both knees    Effusion of right knee    Effusion of left knee joint    PSA [facility]ps[facility]atic arthritis) ([facility] [address]    Chronic idiopathic gout of multiple sites       Past Medical History:   Diagnosis Date    Asthma     seasonal     Gout     Gout     Hyperlipidemia        Past Surgical History:   Procedure Laterality Date    COLONOSCOPY         No Known Allergies    Social History     Socioeconomic History    Marital status:      Spouse name: None    Number of children: None    Years of education: None    Highest education level: None   Occupational History     Employer: SELF-EMPLOYED   Tobacco Use    Smoking status: Former Smoker     Packs/day: 1.00     Years: 12.00     Pack years: 12.00     Types: Cigarettes     Quit date: 1984     Years since quittin.6    Smokeless tobacco: Never Used   Vaping Use    Vaping Use: Never used   Substance and Sexual Activity    Alcohol use: Yes     Comment: social    Drug use: Never    Sexual activity: Yes     Partners: Female   Other Topics Concern    None   Social History Narrative    None     Social Determinants of Health     Financial Resource Strain:     Difficulty of Paying Living Expenses:    Food Insecurity:     Worried About Running Out of Food in the Last Year:     [address] in the Last Year:    Transportation Needs:     Lack of Transportation (Medical):  Lack of Transportation (Non-Medical):    Physical Activity:     Days of Exercise per Week:     Minutes of Exercise per Session:    Stress:     Feeling of Stress :    Social Connections:     Frequency of Communication with Friends and Family:     Frequency of Social Gatherings with Friends and Family:     Attends Anglican Services:     Active Member of Clubs or Organizations:     Attends Club or Organization Meetings:     Marital Status:    Intimate Partner Violence:     Fear of Current or Ex-Partner:     Emotionally Abused:     Physically Abused:     Sexually Abused:        Family History   Problem Relation Age of Onset    Heart Disease Father 50        had rheumatic fever as child ACB     Cancer Sister 48         breast [place]         Review of Systems  As follows except as previously noted in HPI:  Constitutional: Negative for chills, diaphoresis, fatigue, fever and unexpected weight change. Respiratory: Negative for cough, shortness of breath and wheezing. Cardiovascular: Negative for chest pain and palpitations.

## 2021-08-11 ENCOUNTER — OFFICE VISIT (OUTPATIENT)
Dept: ORTHOPEDIC SURGERY | Age: 56
End: 2021-08-11
Payer: COMMERCIAL

## 2021-08-11 VITALS — BODY MASS INDEX: 29.82 KG/M2 | WEIGHT: 225 LBS | HEIGHT: 73 IN

## 2021-08-11 DIAGNOSIS — L40.50 PSA (PSORIATIC ARTHRITIS) (HCC): ICD-10-CM

## 2021-08-11 DIAGNOSIS — M25.462 PAIN AND SWELLING OF LEFT KNEE: Primary | ICD-10-CM

## 2021-08-11 DIAGNOSIS — M25.561 RIGHT KNEE PAIN, UNSPECIFIED CHRONICITY: ICD-10-CM

## 2021-08-11 DIAGNOSIS — M25.562 PAIN AND SWELLING OF LEFT KNEE: Primary | ICD-10-CM

## 2021-08-11 DIAGNOSIS — M25.461 EFFUSION OF RIGHT KNEE: ICD-10-CM

## 2021-08-11 PROCEDURE — 20610 DRAIN/INJ JOINT/BURSA W/O US: CPT | Performed by: ORTHOPAEDIC SURGERY

## 2021-08-11 PROCEDURE — 99213 OFFICE O/P EST LOW 20 MIN: CPT | Performed by: ORTHOPAEDIC SURGERY

## 2021-08-11 PROCEDURE — 1036F TOBACCO NON-USER: CPT | Performed by: ORTHOPAEDIC SURGERY

## 2021-08-11 PROCEDURE — G8427 DOCREV CUR MEDS BY ELIG CLIN: HCPCS | Performed by: ORTHOPAEDIC SURGERY

## 2021-08-11 PROCEDURE — 3017F COLORECTAL CA SCREEN DOC REV: CPT | Performed by: ORTHOPAEDIC SURGERY

## 2021-08-11 PROCEDURE — G8419 CALC BMI OUT NRM PARAM NOF/U: HCPCS | Performed by: ORTHOPAEDIC SURGERY

## 2021-08-11 RX ORDER — BUPIVACAINE HYDROCHLORIDE 2.5 MG/ML
3 INJECTION, SOLUTION INFILTRATION; PERINEURAL ONCE
Status: COMPLETED | OUTPATIENT
Start: 2021-08-11 | End: 2021-08-11

## 2021-08-11 RX ORDER — TRIAMCINOLONE ACETONIDE 40 MG/ML
80 INJECTION, SUSPENSION INTRA-ARTICULAR; INTRAMUSCULAR ONCE
Status: COMPLETED | OUTPATIENT
Start: 2021-08-11 | End: 2021-08-11

## 2021-08-11 RX ADMIN — BUPIVACAINE HYDROCHLORIDE 7.5 MG: 2.5 INJECTION, SOLUTION INFILTRATION; PERINEURAL at 13:30

## 2021-08-11 RX ADMIN — TRIAMCINOLONE ACETONIDE 80 MG: 40 INJECTION, SUSPENSION INTRA-ARTICULAR; INTRAMUSCULAR at 13:30

## 2021-08-11 NOTE — PROGRESS NOTES
Chief Complaint:   Chief Complaint   Patient presents with    Knee Pain     Bilateral knee pain and swelling. Radha Quesada presents with both persistent and increased swelling and pain in both knees, he did play golf over the weekend that may have contributed. Pain is not responding to rest or over-the-counter medication. No fever chills sweats or other systemic symptom. Remains off his remittive agents for his psoriasis. Allergies; medications; past medical, surgical, family, and social history; and problem list have been reviewed today and updated as indicated in this encounter seen below. Exam: Bilateral knees remain well aligned but he now has moderate effusions with mild warmth tenderness but no erythema. Motion is normal but painful at the extremes. Knees are stable medial lateral and AP stress with normal patellar tracking. Radiographs: Most recent x-rays of knees are reviewed showing no appreciable degenerative disease or deformity. Astrid Ma was seen today for knee pain. Diagnoses and all orders for this visit:    Pain and swelling of left knee  -     RI ARTHROCENTESIS ASPIR&/INJ MAJOR JT/BURSA W/O US    Right knee pain, unspecified chronicity    PSA (psoriatic arthritis) (HCC)    Effusion of right knee  -     RI ARTHROCENTESIS ASPIR&/INJ MAJOR JT/BURSA W/O US    Other orders  -     triamcinolone acetonide (KENALOG-40) injection 80 mg  -     bupivacaine (MARCAINE) 0.25 % injection 7.5 mg       Impressions recurring inflammatory effusion secondary to his underlying autoimmune disease.   Given the recurring effusions and previous success with aspiration injection at the patient's request I aspirated bilateral knees today through a lateral retropatellar approach under sterile technique local anesthesia obtaining 40 to 45 mL of very slightly turbid otherwise normally appearing synovial fluid, analysis not considered indicated as previous analysis is consistent with inflammatory arthritis no evidence for sepsis at this time. Knees were injected with Kenalog and Marcaine without difficulty or complication. Patient was encouraged to discuss the relative risk versus benefit of resumption of his remittive agents to control the symptoms in the future. Questions asked and answered follow-up in 6 weeks or as needed. Return in about 6 weeks (around 9/22/2021). Current Outpatient Medications   Medication Sig Dispense Refill    omeprazole (PRILOSEC) 20 MG delayed release capsule Take 1 capsule by mouth daily      adalimumab (HUMIRA) 40 MG/0.8ML injection Inject 40 mg into the skin every 14 days       OTEZLA 30 MG TABS Take by mouth 2 times daily       atorvastatin (LIPITOR) 80 MG tablet Take 80 mg by mouth daily       fluticasone (FLONASE) 50 MCG/ACT nasal spray 1 spray as needed      ibuprofen (ADVIL;MOTRIN) 200 MG tablet Take 200 mg by mouth as needed      ketoconazole (NIZORAL) 2 % shampoo Apply topically daily      LORazepam (ATIVAN) 0.5 MG tablet Take 0.5 mg by mouth as needed  0    allopurinol (ZYLOPRIM) 300 MG tablet Take 300 mg by mouth daily. No current facility-administered medications for this visit.        Patient Active Problem List   Diagnosis    Gout of both knees    Effusion of right knee    Effusion of left knee joint    PSA [facility]psoriatic arthritis) ([facility] [address]    Chronic idiopathic gout of multiple sites       Past Medical History:   Diagnosis Date    Asthma     seasonal     Gout     Gout     Hyperlipidemia        Past Surgical History:   Procedure Laterality Date    COLONOSCOPY         No Known Allergies    Social History     Socioeconomic History    Marital status:      Spouse name: None    Number of children: None    Years of education: None    Highest education level: None   Occupational History     Employer: SELF-EMPLOYED   Tobacco Use    Smoking status: Former Smoker     Packs/day: 1.00     Years: 12.00     Pack years: 12.00     Types: Cigarettes     Quit date: 1984     Years since quittin.6    Smokeless tobacco: Never Used   Vaping Use    Vaping Use: Never used   Substance and Sexual Activity    Alcohol use: Yes     Comment: social    Drug use: Never    Sexual activity: Yes     Partners: Female   Other Topics Concern    None   Social History Narrative    None     Social Determinants of Health     Financial Resource Strain:     Difficulty of Paying Living Expenses:    Food Insecurity:     Worried About Running Out of Food in the Last Year:     Ran Out of Food in the Last Year:    Transportation Needs:     Lack of Transportation (Medical):  Lack of Transportation (Non-Medical):    Physical Activity:     Days of Exercise per Week:     Minutes of Exercise per Session:    Stress:     Feeling of Stress :    Social Connections:     Frequency of Communication with Friends and Family:     Frequency of Social Gatherings with Friends and Family:     Attends Mormon Services:     Active Member of Clubs or Organizations:     Attends Club or Organization Meetings:     Marital Status:    Intimate Partner Violence:     Fear of Current or Ex-Partner:     Emotionally Abused:     Physically Abused:     Sexually Abused:        Family History   Problem Relation Age of Onset    Heart Disease Father 50        had rheumatic fever as child ACB     Cancer Sister 48         breast [place]         Review of Systems  As follows except as previously noted in HPI:  Constitutional: Negative for chills, diaphoresis, fatigue, fever and unexpected weight change. Respiratory: Negative for cough, shortness of breath and wheezing. Cardiovascular: Negative for chest pain and palpitations. Neurological: Negative for dizziness, syncope, cephalgia. GI / : negative  Musculoskeletal: see HPI       Objective:   Physical Exam   Constitutional: Oriented to person, place, and time.  and appears well-developed and well-nourished. :   Head: Normocephalic and atraumatic. Eyes: EOM are normal.   Neck: Neck supple. Cardiovascular: Normal rate and regular rhythm. Pulmonary/Chest: Effort normal. No stridor. No respiratory distress, no wheezes. Abdominal:  No abnormal distension. Neurological: Alert and oriented to person, place, and time. Skin: Skin is warm and dry. Psychiatric: Normal mood and affect.  Behavior is normal. Thought content normal.    8/11/2021  5:50 PM

## 2021-09-29 ENCOUNTER — OFFICE VISIT (OUTPATIENT)
Dept: ORTHOPEDIC SURGERY | Age: 56
End: 2021-09-29
Payer: COMMERCIAL

## 2021-09-29 VITALS — HEIGHT: 73 IN | BODY MASS INDEX: 29.16 KG/M2 | WEIGHT: 220 LBS

## 2021-09-29 DIAGNOSIS — M25.462 PAIN AND SWELLING OF LEFT KNEE: Primary | ICD-10-CM

## 2021-09-29 DIAGNOSIS — M25.562 PAIN AND SWELLING OF LEFT KNEE: Primary | ICD-10-CM

## 2021-09-29 PROCEDURE — G8419 CALC BMI OUT NRM PARAM NOF/U: HCPCS | Performed by: ORTHOPAEDIC SURGERY

## 2021-09-29 PROCEDURE — 3017F COLORECTAL CA SCREEN DOC REV: CPT | Performed by: ORTHOPAEDIC SURGERY

## 2021-09-29 PROCEDURE — 99213 OFFICE O/P EST LOW 20 MIN: CPT | Performed by: ORTHOPAEDIC SURGERY

## 2021-09-29 PROCEDURE — 1036F TOBACCO NON-USER: CPT | Performed by: ORTHOPAEDIC SURGERY

## 2021-09-29 PROCEDURE — G8427 DOCREV CUR MEDS BY ELIG CLIN: HCPCS | Performed by: ORTHOPAEDIC SURGERY

## 2021-09-29 NOTE — PROGRESS NOTES
Chief Complaint:   Chief Complaint   Patient presents with    Knee Pain     F/u left knee. Yesterday knee was swollen quite a bit and painful. Left work early and iced it. Today it is somewhat improved. Tramaine Trejo presents with recurring swelling of his left knee, did well following the injection, he said he had a wedding this week and did a lot of dancing and thinks this may have aggravated his left knee. States he had severe swelling yesterday but has been resting and icing it and taking ibuprofen once daily and is feeling much better today. No fever chills sweats or other active joint complaints. Allergies; medications; past medical, surgical, family, and social history; and problem list have been reviewed today and updated as indicated in this encounter seen below. Exam: Left knee normally aligned stable stress nonerythematous minimal tenderness to palpation, mild benign effusion today normal patellar tracking. Full range of motion uncomfortable in deep flexion only. Radiographs: Deferred, recent x-rays on review showing minimal appreciable degenerative disease of either knee. Racheal Glen was seen today for knee pain. Diagnoses and all orders for this visit:    Pain and swelling of left knee       At this point I do not think repeat aspiration will be helpful, it has not been that long since he had his last steroid injection, he is improving with rest and simple oral medication, I encouraged him to increase his dose of ibuprofen in the short-term, more significantly we talked about his recurring episodes of inflammatory arthritis most likely related to the psoriatic arthritis, he continues to be apprehensive about taking remittive agents which is understandable but he is having persisting musculoskeletal symptoms that are not surgical in nature and he should limit the number of injections he gets to only a few a year if necessary.   He was encouraged to discuss these options for medical treatment of his inflammatory condition with his primary care physician and his rheumatologist, follow-up here as needed. Return if symptoms worsen or fail to improve. Current Outpatient Medications   Medication Sig Dispense Refill    cyanocobalamin 1000 MCG tablet Take 2,000 mcg by mouth daily      omeprazole (PRILOSEC) 20 MG delayed release capsule Take 1 capsule by mouth daily      atorvastatin (LIPITOR) 80 MG tablet Take 80 mg by mouth daily       fluticasone (FLONASE) 50 MCG/ACT nasal spray 1 spray as needed      ibuprofen (ADVIL;MOTRIN) 200 MG tablet Take 200 mg by mouth as needed      ketoconazole (NIZORAL) 2 % shampoo Apply topically daily      LORazepam (ATIVAN) 0.5 MG tablet Take 0.5 mg by mouth as needed  0    allopurinol (ZYLOPRIM) 300 MG tablet Take 300 mg by mouth daily.  adalimumab (HUMIRA) 40 MG/0.8ML injection Inject 40 mg into the skin every 14 days  (Patient not taking: Reported on 9/29/2021)      OTEZLA 30 MG TABS Take by mouth 2 times daily  (Patient not taking: Reported on 9/29/2021)       No current facility-administered medications for this visit.        Patient Active Problem List   Diagnosis    Gout of both knees    Effusion of right knee    Effusion of left knee joint    PSA [facility]psoriatic arthritis) ([facility] [address]    Chronic idiopathic gout of multiple sites       Past Medical History:   Diagnosis Date    Asthma     seasonal     Gout     Gout     Hyperlipidemia        Past Surgical History:   Procedure Laterality Date    COLONOSCOPY         No Known Allergies    Social History     Socioeconomic History    Marital status:      Spouse name: None    Number of children: None    Years of education: None    Highest education level: None   Occupational History     Employer: SELF-EMPLOYED   Tobacco Use    Smoking status: Former Smoker     Packs/day: 1.00     Years: 12.00     Pack years: 12.00     Types: Cigarettes     Quit date: 12/28/1984     Years since quittin.7    Smokeless tobacco: Never Used   Vaping Use    Vaping Use: Never used   Substance and Sexual Activity    Alcohol use: Yes     Comment: social    Drug use: Never    Sexual activity: Yes     Partners: Female   Other Topics Concern    None   Social History Narrative    None     Social Determinants of Health     Financial Resource Strain:     Difficulty of Paying Living Expenses:    Food Insecurity:     Worried About Running Out of Food in the Last Year:     [address] in the Last Year:    Transportation Needs:     Lack of Transportation (Medical):  Lack of Transportation (Non-Medical):    Physical Activity:     Days of Exercise per Week:     Minutes of Exercise per Session:    Stress:     Feeling of Stress :    Social Connections:     Frequency of Communication with Friends and Family:     Frequency of Social Gatherings with Friends and Family:     Attends Oriental orthodox Services:     Active Member of Clubs or Organizations:     Attends Club or Organization Meetings:     Marital Status:    Intimate Partner Violence:     Fear of Current or Ex-Partner:     Emotionally Abused:     Physically Abused:     Sexually Abused:        Family History   Problem Relation Age of Onset    Heart Disease Father 50        had rheumatic fever as child ACB     Cancer Sister 48         breast [place]         Review of Systems  As follows except as previously noted in HPI:  Constitutional: Negative for chills, diaphoresis, fatigue, fever and unexpected weight change. Respiratory: Negative for cough, shortness of breath and wheezing. Cardiovascular: Negative for chest pain and palpitations. Neurological: Negative for dizziness, syncope, cephalgia. GI / : negative  Musculoskeletal: see HPI       Objective:   Physical Exam   Constitutional: Oriented to person, place, and time. and appears well-developed and well-nourished. :   Head: Normocephalic and atraumatic.    Eyes: EOM are normal. Neck: Neck supple. Cardiovascular: Normal rate and regular rhythm. Pulmonary/Chest: Effort normal. No stridor. No respiratory distress, no wheezes. Abdominal:  No abnormal distension. Neurological: Alert and oriented to person, place, and time. Skin: Skin is warm and dry. Psychiatric: Normal mood and affect.  Behavior is normal. Thought content normal.    9/29/2021  3:24 PM

## 2021-11-30 ENCOUNTER — OFFICE VISIT (OUTPATIENT)
Dept: ORTHOPEDIC SURGERY | Age: 56
End: 2021-11-30
Payer: COMMERCIAL

## 2021-11-30 VITALS — HEIGHT: 73 IN | WEIGHT: 220 LBS | BODY MASS INDEX: 29.16 KG/M2

## 2021-11-30 DIAGNOSIS — M25.462 PAIN AND SWELLING OF LEFT KNEE: Primary | ICD-10-CM

## 2021-11-30 DIAGNOSIS — M25.562 PAIN AND SWELLING OF LEFT KNEE: Primary | ICD-10-CM

## 2021-11-30 PROCEDURE — 99213 OFFICE O/P EST LOW 20 MIN: CPT | Performed by: ORTHOPAEDIC SURGERY

## 2021-11-30 PROCEDURE — G8427 DOCREV CUR MEDS BY ELIG CLIN: HCPCS | Performed by: ORTHOPAEDIC SURGERY

## 2021-11-30 PROCEDURE — 1036F TOBACCO NON-USER: CPT | Performed by: ORTHOPAEDIC SURGERY

## 2021-11-30 PROCEDURE — 3017F COLORECTAL CA SCREEN DOC REV: CPT | Performed by: ORTHOPAEDIC SURGERY

## 2021-11-30 PROCEDURE — G8419 CALC BMI OUT NRM PARAM NOF/U: HCPCS | Performed by: ORTHOPAEDIC SURGERY

## 2021-11-30 PROCEDURE — 20610 DRAIN/INJ JOINT/BURSA W/O US: CPT | Performed by: ORTHOPAEDIC SURGERY

## 2021-11-30 PROCEDURE — G8484 FLU IMMUNIZE NO ADMIN: HCPCS | Performed by: ORTHOPAEDIC SURGERY

## 2021-11-30 RX ORDER — BUPIVACAINE HYDROCHLORIDE 2.5 MG/ML
3 INJECTION, SOLUTION INFILTRATION; PERINEURAL ONCE
Status: COMPLETED | OUTPATIENT
Start: 2021-11-30 | End: 2021-11-30

## 2021-11-30 RX ORDER — TRIAMCINOLONE ACETONIDE 40 MG/ML
80 INJECTION, SUSPENSION INTRA-ARTICULAR; INTRAMUSCULAR ONCE
Status: COMPLETED | OUTPATIENT
Start: 2021-11-30 | End: 2021-11-30

## 2021-11-30 RX ORDER — TADALAFIL 20 MG/1
TABLET ORAL
COMMUNITY
Start: 2021-11-19

## 2021-11-30 RX ADMIN — TRIAMCINOLONE ACETONIDE 80 MG: 40 INJECTION, SUSPENSION INTRA-ARTICULAR; INTRAMUSCULAR at 12:00

## 2021-11-30 RX ADMIN — BUPIVACAINE HYDROCHLORIDE 7.5 MG: 2.5 INJECTION, SOLUTION INFILTRATION; PERINEURAL at 11:59

## 2021-12-01 NOTE — PROGRESS NOTES
Chief Complaint:   Chief Complaint   Patient presents with    Knee Pain     FU,  Continues to have left knee pain and swelling. Taking Advil 800 mg bid for pain and swelling. Traveling to [facility] for daughter's wedding on 12/11/2021. Discuss aspiration left knee. Stephanie Landa presents now with persisting left knee pain and swelling, taking high doses of ibuprofen without much relief, right knee has some tolerable pain but has not been swelling. Patient is trying new reestablish care with rheumatology in [facility] [email or web address] with an appointment scheduled for April. He states he realizes he should get back on his remittive agents for his psoriasis which helped his arthritis as well. No fever chills sweats and no other joint complaints. Allergies; medications; past medical, surgical, family, and social history; and problem list have been reviewed today and updated as indicated in this encounter seen below. Exam: Right knee benign today, little bit tender but without synovitis or appreciable effusion, well aligned and full range of motion. Left knee shows recurrence of a moderate to significant effusion which is mildly warm diffusely tender to palpation but the knee remains in good alignment with normal patellar tracking and range of motion 0 to 120 degrees although uncomfortable in deep flexion. Radiographs: Deferred today recent x-rays from a few months ago are reviewed showing no significant degenerative disease nor para-articular erosions. Minor Ordaz was seen today for knee pain.     Diagnoses and all orders for this visit:    Pain and swelling of left knee  -     [place] ARTHROCENTESIS ASPIR&/INJ MAJOR JT/BURSA W/O US    Other orders  -     triamcinolone acetonide (KENALOG-40) injection 80 mg  -     bupivacaine (MARCAINE) 0.25 % injection 7.5 mg       Diagnosis of inflammatory effusions of the left knee likely secondary to his underlying autoimmune state was again reviewed with the patient, he was encouraged to follow through with reestablishing rheumatology care, we talked about the alternative of arthroscopic synovectomy and that it should be deferred until he has optimize medical management. In the meantime at his request I aspirated the left knee today through a lateral approach sterile technique local anesthesia obtaining 60 mL of very slightly turbid but otherwise normal yellow synovial fluid normal viscosity analysis not considered indicated. Knee was also injected with Kenalog and Marcaine, patient tolerated procedure well, continue activities to tolerance follow-up with his medical issues and see us as needed. Return if symptoms worsen or fail to improve. Current Outpatient Medications   Medication Sig Dispense Refill    tadalafil (CIALIS) 20 MG tablet       cyanocobalamin 1000 MCG tablet Take 2,000 mcg by mouth daily      omeprazole (PRILOSEC) 20 MG delayed release capsule Take 1 capsule by mouth daily      atorvastatin (LIPITOR) 80 MG tablet Take 80 mg by mouth daily       fluticasone (FLONASE) 50 MCG/ACT nasal spray 1 spray as needed      ibuprofen (ADVIL;MOTRIN) 200 MG tablet Take 200 mg by mouth as needed      ketoconazole (NIZORAL) 2 % shampoo Apply topically daily      LORazepam (ATIVAN) 0.5 MG tablet Take 0.5 mg by mouth as needed  0    allopurinol (ZYLOPRIM) 300 MG tablet Take 300 mg by mouth daily.  adalimumab (HUMIRA) 40 MG/0.8ML injection Inject 40 mg into the skin every 14 days  (Patient not taking: Reported on 9/29/2021)      OTEZLA 30 MG TABS Take by mouth 2 times daily  (Patient not taking: Reported on 9/29/2021)       No current facility-administered medications for this visit.        Patient Active Problem List   Diagnosis    Gout of both knees    Effusion of right knee    Effusion of left knee joint    PSA [facility]psoriatic arthritis) [address] [address]    Chronic idiopathic gout of multiple sites       Past Medical History:   Diagnosis Date    Asthma     seasonal     Gout     Gout     Hyperlipidemia        Past Surgical History:   Procedure Laterality Date    COLONOSCOPY         No Known Allergies    Social History     Socioeconomic History    Marital status:      Spouse name: None    Number of children: None    Years of education: None    Highest education level: None   Occupational History     Employer: SELF-EMPLOYED   Tobacco Use    Smoking status: Former Smoker     Packs/day: 1.00     Years: 12.00     Pack years: 12.00     Types: Cigarettes     Quit date: 1984     Years since quittin.9    Smokeless tobacco: Never Used   Vaping Use    Vaping Use: Never used   Substance and Sexual Activity    Alcohol use: Yes     Comment: social    Drug use: Never    Sexual activity: Yes     Partners: Female   Other Topics Concern    None   Social History Narrative    None     Social Determinants of Health     Financial Resource Strain:     Difficulty of Paying Living Expenses: Not on file   Food Insecurity:     Worried About Running Out of Food in the Last Year: Not on file    Garcia of Food in the Last Year: Not on file   Transportation Needs:     Lack of Transportation (Medical): Not on file    Lack of Transportation (Non-Medical):  Not on file   Physical Activity:     Days of Exercise per Week: Not on file    Minutes of Exercise per Session: Not on file   Stress:     Feeling of Stress : Not on file   Social Connections:     Frequency of Communication with Friends and Family: Not on file    Frequency of Social Gatherings with Friends and Family: Not on file    Attends Restoration Services: Not on file    Active Member of Clubs or Organizations: Not on file    Attends Club or Organization Meetings: Not on file    Marital Status: Not on file   Intimate Partner Violence:     Fear of Current or Ex-Partner: Not on file    Emotionally Abused: Not on file    Physically Abused: Not on file    Sexually Abused: Not on file   Housing Stability:     Unable to Pay for Housing in the Last Year: Not on file    Number of Places Lived in the Last Year: Not on file    Unstable Housing in the Last Year: Not on file       Family History   Problem Relation Age of Onset    Heart Disease Father 50        had rheumatic fever as child ACB     Cancer Sister 48         breast [place]         Review of Systems  As follows except as previously noted in HPI:  Constitutional: Negative for chills, diaphoresis, fatigue, fever and unexpected weight change. Respiratory: Negative for cough, shortness of breath and wheezing. Cardiovascular: Negative for chest pain and palpitations. Neurological: Negative for dizziness, syncope, cephalgia. GI / : negative  Musculoskeletal: see HPI       Objective:   Physical Exam   Constitutional: Oriented to person, place, and time. and appears well-developed and well-nourished. :   Head: Normocephalic and atraumatic. Eyes: EOM are normal.   Neck: Neck supple. Cardiovascular: Normal rate and regular rhythm. Pulmonary/Chest: Effort normal. No stridor. No respiratory distress, no wheezes. Abdominal:  No abnormal distension. Neurological: Alert and oriented to person, place, and time. Skin: Skin is warm and dry. Psychiatric: Normal mood and affect.  Behavior is normal. Thought content normal.    2021  9:18 PM

## 2022-02-09 ENCOUNTER — TELEPHONE (OUTPATIENT)
Dept: ORTHOPEDIC SURGERY | Age: 57
End: 2022-02-09

## 2022-02-09 NOTE — TELEPHONE ENCOUNTER
Patient called to request appt for swelling right knee. Leaving for vacation 2/16/2022. Per [facility] offer appt for 2/15/2022. Spoke with patient regarding appt. He states his PCP gave him Prednisone today for his asthma which patient thinks may also help his knee. He will keep appt for 2/15/2022 but will call to cancel if he improves with Prednisone.

## 2022-03-16 ENCOUNTER — OFFICE VISIT (OUTPATIENT)
Dept: ORTHOPEDIC SURGERY | Age: 57
End: 2022-03-16
Payer: COMMERCIAL

## 2022-03-16 VITALS — WEIGHT: 220 LBS | BODY MASS INDEX: 29.16 KG/M2 | HEIGHT: 73 IN

## 2022-03-16 DIAGNOSIS — M25.461 PAIN AND SWELLING OF RIGHT KNEE: Primary | ICD-10-CM

## 2022-03-16 DIAGNOSIS — M25.561 PAIN AND SWELLING OF RIGHT KNEE: Primary | ICD-10-CM

## 2022-03-16 PROCEDURE — G8419 CALC BMI OUT NRM PARAM NOF/U: HCPCS | Performed by: ORTHOPAEDIC SURGERY

## 2022-03-16 PROCEDURE — 1036F TOBACCO NON-USER: CPT | Performed by: ORTHOPAEDIC SURGERY

## 2022-03-16 PROCEDURE — G8427 DOCREV CUR MEDS BY ELIG CLIN: HCPCS | Performed by: ORTHOPAEDIC SURGERY

## 2022-03-16 PROCEDURE — G8484 FLU IMMUNIZE NO ADMIN: HCPCS | Performed by: ORTHOPAEDIC SURGERY

## 2022-03-16 PROCEDURE — 3017F COLORECTAL CA SCREEN DOC REV: CPT | Performed by: ORTHOPAEDIC SURGERY

## 2022-03-16 PROCEDURE — 99213 OFFICE O/P EST LOW 20 MIN: CPT | Performed by: ORTHOPAEDIC SURGERY

## 2022-03-16 PROCEDURE — 20610 DRAIN/INJ JOINT/BURSA W/O US: CPT | Performed by: ORTHOPAEDIC SURGERY

## 2022-03-16 RX ORDER — TRIAMCINOLONE ACETONIDE 40 MG/ML
80 INJECTION, SUSPENSION INTRA-ARTICULAR; INTRAMUSCULAR ONCE
Status: COMPLETED | OUTPATIENT
Start: 2022-03-16 | End: 2022-03-16

## 2022-03-16 RX ORDER — BUPIVACAINE HYDROCHLORIDE 2.5 MG/ML
3 INJECTION, SOLUTION INFILTRATION; PERINEURAL ONCE
Status: COMPLETED | OUTPATIENT
Start: 2022-03-16 | End: 2022-03-16

## 2022-03-16 RX ADMIN — TRIAMCINOLONE ACETONIDE 80 MG: 40 INJECTION, SUSPENSION INTRA-ARTICULAR; INTRAMUSCULAR at 11:41

## 2022-03-16 RX ADMIN — BUPIVACAINE HYDROCHLORIDE 7.5 MG: 2.5 INJECTION, SOLUTION INFILTRATION; PERINEURAL at 11:40

## 2022-03-17 NOTE — PROGRESS NOTES
Chief Complaint:   Chief Complaint   Patient presents with    Knee Pain     Pain and swelling right knee. Requesting injection. Jero Loomis presents with acute and severe right knee swelling and pain with stiffness, left knee doing well following aspiration and injection a few months back. No fever chills sweats or other systemic symptom. He has a pending appointment with a new rheumatologist in [facility] [place] and would consider going back on a biologic if it would be helpful. Requests aspiration and/or injection right knee today. Allergies; medications; past medical, surgical, family, and social history; and problem list have been reviewed today and updated as indicated in this encounter seen below. Exam: Right knee shows a severe tense effusion, tender to palpation nonerythematous minimally warm, motion is limited therefore to less than 90 degrees of flexion by pain. No laxity with stress. Radiographs: Weightbearing AP x-ray of the knees including lateral of the right today show mild medial narrowing of both knees not particularly progressive compared to previous films. No erosions or evidence of neoplasia. Basilia Kehr was seen today for knee pain. Diagnoses and all orders for this visit:    Pain and swelling of right knee  -     XR KNEE BILATERAL STANDING  -     XR KNEE RIGHT (1-2 VIEWS)  -     [place] ARTHROCENTESIS ASPIR&/INJ MAJOR JT/BURSA W/O US    Other orders  -     triamcinolone acetonide (KENALOG-40) injection 80 mg  -     bupivacaine (MARCAINE) 0.25 % injection 7.5 mg       Impression is recurrent inflammatory effusion right knee. At the patient's request I aspirated the right knee through a lateral patellar approach obtaining 150 mL of predominantly clear minimally cloudy synovial fluid consistent with prior aspirations, analysis not considered indicated.   I then injected the knee with Kenalog and Marcaine, procedure was tolerated well without difficulty or complication and the patient felt much better with improved motion thereafter. Questions asked and answered follow-up as needed. Return if symptoms worsen or fail to improve. Current Outpatient Medications   Medication Sig Dispense Refill    tadalafil (CIALIS) 20 MG tablet       cyanocobalamin 1000 MCG tablet Take 2,000 mcg by mouth daily      omeprazole (PRILOSEC) 20 MG delayed release capsule Take 1 capsule by mouth daily      adalimumab (HUMIRA) 40 MG/0.8ML injection Inject 40 mg into the skin every 14 days       OTEZLA 30 MG TABS Take by mouth 2 times daily       atorvastatin (LIPITOR) 80 MG tablet Take 80 mg by mouth daily       fluticasone (FLONASE) 50 MCG/ACT nasal spray 1 spray as needed      ibuprofen (ADVIL;MOTRIN) 200 MG tablet Take 200 mg by mouth as needed      ketoconazole (NIZORAL) 2 % shampoo Apply topically daily      LORazepam (ATIVAN) 0.5 MG tablet Take 0.5 mg by mouth as needed  0    allopurinol (ZYLOPRIM) 300 MG tablet Take 300 mg by mouth daily. No current facility-administered medications for this visit.        Patient Active Problem List   Diagnosis    Gout of both knees    Effusion of right knee    Effusion of left knee joint    PSA [facility]psoriatic arthritis) ([facility] [address]    Chronic idiopathic gout of multiple sites       Past Medical History:   Diagnosis Date    Asthma     seasonal     Gout     Gout     Hyperlipidemia        Past Surgical History:   Procedure Laterality Date    COLONOSCOPY         No Known Allergies    Social History     Socioeconomic History    Marital status:      Spouse name: None    Number of children: None    Years of education: None    Highest education level: None   Occupational History     Employer: SELF-EMPLOYED   Tobacco Use    Smoking status: Former Smoker     Packs/day: 1.00     Years: 12.00     Pack years: 12.00     Types: Cigarettes     Quit date: 1984     Years since quittin.2    Smokeless tobacco: Never Used   Vaping Use    Vaping Use: Never used   Substance and Sexual Activity    Alcohol use: Yes     Comment: social    Drug use: Never    Sexual activity: Yes     Partners: Female   Other Topics Concern    None   Social History Narrative    None     Social Determinants of Health     Financial Resource Strain:     Difficulty of Paying Living Expenses: Not on file   Food Insecurity:     Worried About Running Out of Food in the Last Year: Not on file    Garcia of Food in the Last Year: Not on file   Transportation Needs:     Lack of Transportation (Medical): Not on file    Lack of Transportation (Non-Medical): Not on file   Physical Activity:     Days of Exercise per Week: Not on file    Minutes of Exercise per Session: Not on file   Stress:     Feeling of Stress : Not on file   Social Connections:     Frequency of Communication with Friends and Family: Not on file    Frequency of Social Gatherings with Friends and Family: Not on file    Attends Yarsani Services: Not on file    Active Member of [address] [place] [address] or Organizations: Not on file    Attends Club or Organization Meetings: Not on file    Marital Status: Not on file   Intimate Partner Violence:     Fear of Current or Ex-Partner: Not on file    Emotionally Abused: Not on file    Physically Abused: Not on file    Sexually Abused: Not on file   Housing Stability:     Unable to Pay for Housing in the Last Year: Not on file    Number of [place] in the Last Year: Not on file    Unstable Housing in the Last Year: Not on file       Family History   Problem Relation Age of Onset    Heart Disease Father 50        had rheumatic fever as child ACB     Cancer Sister 48         breast [place]         Review of Systems  As follows except as previously noted in HPI:  Constitutional: Negative for chills, diaphoresis, fatigue, fever and unexpected weight change. Respiratory: Negative for cough, shortness of breath and wheezing.     Cardiovascular: Negative for chest pain and palpitations. Neurological: Negative for dizziness, syncope, cephalgia. GI / : negative  Musculoskeletal: see HPI       Objective:   Physical Exam   Constitutional: Oriented to person, place, and time. and appears well-developed and well-nourished. :   Head: Normocephalic and atraumatic. Eyes: EOM are normal.   Neck: Neck supple. Cardiovascular: Normal rate and regular rhythm. Pulmonary/Chest: Effort normal. No stridor. No respiratory distress, no wheezes. Abdominal:  No abnormal distension. Neurological: Alert and oriented to person, place, and time. Skin: Skin is warm and dry. Psychiatric: Normal mood and affect.  Behavior is normal. Thought content normal.    3/17/2022  4:54 PM

## 2022-04-28 ENCOUNTER — OFFICE VISIT (OUTPATIENT)
Dept: ORTHOPEDIC SURGERY | Age: 57
End: 2022-04-28
Payer: COMMERCIAL

## 2022-04-28 VITALS — BODY MASS INDEX: 29.16 KG/M2 | HEIGHT: 73 IN | WEIGHT: 220 LBS

## 2022-04-28 DIAGNOSIS — M25.462 PAIN AND SWELLING OF LEFT KNEE: Primary | ICD-10-CM

## 2022-04-28 DIAGNOSIS — M25.462 PAIN AND SWELLING OF LEFT KNEE: ICD-10-CM

## 2022-04-28 DIAGNOSIS — M25.562 PAIN AND SWELLING OF LEFT KNEE: Primary | ICD-10-CM

## 2022-04-28 DIAGNOSIS — M25.562 PAIN AND SWELLING OF LEFT KNEE: ICD-10-CM

## 2022-04-28 LAB
APPEARANCE FLUID: NORMAL
CELL COUNT FLUID TYPE: NORMAL
COLOR FLUID: NORMAL
MONOCYTE, FLUID: 74 %
NEUTROPHIL, FLUID: 26 %
NUCLEATED CELLS FLUID: 2677 /UL
RBC FLUID: 5000 /UL

## 2022-04-28 PROCEDURE — 20610 DRAIN/INJ JOINT/BURSA W/O US: CPT | Performed by: ORTHOPAEDIC SURGERY

## 2022-04-28 RX ORDER — PANTOPRAZOLE SODIUM 40 MG/1
TABLET, DELAYED RELEASE ORAL
COMMUNITY
Start: 2022-04-19

## 2022-04-28 RX ORDER — METHYLPREDNISOLONE 4 MG/1
TABLET ORAL
Qty: 1 KIT | Refills: 0 | Status: SHIPPED | OUTPATIENT
Start: 2022-04-28 | End: 2022-05-04

## 2022-04-28 RX ORDER — TRIAMCINOLONE ACETONIDE 40 MG/ML
80 INJECTION, SUSPENSION INTRA-ARTICULAR; INTRAMUSCULAR ONCE
Status: COMPLETED | OUTPATIENT
Start: 2022-04-28 | End: 2022-04-28

## 2022-04-28 RX ORDER — BUPIVACAINE HYDROCHLORIDE 2.5 MG/ML
3 INJECTION, SOLUTION INFILTRATION; PERINEURAL ONCE
Status: COMPLETED | OUTPATIENT
Start: 2022-04-28 | End: 2022-04-28

## 2022-04-28 RX ADMIN — TRIAMCINOLONE ACETONIDE 80 MG: 40 INJECTION, SUSPENSION INTRA-ARTICULAR; INTRAMUSCULAR at 15:00

## 2022-04-28 RX ADMIN — BUPIVACAINE HYDROCHLORIDE 7.5 MG: 2.5 INJECTION, SOLUTION INFILTRATION; PERINEURAL at 15:00

## 2022-04-28 NOTE — PROGRESS NOTES
Chief Complaint:   Chief Complaint   Patient presents with    Knee Pain     Increase in left knee pain. Feels knee needs drained and injected. Josselin Correa presents with recurring pain and significant swelling and stiffness of the left knee. Right knee doing very well following aspiration and injection end of last year. He has resumed rheumatologic evaluation and is currently being considered for Enbrel. Complains at this point of generalized aching and recurring pain in multiple joints of lower and upper extremities although left knee is primary at this time. No fever chills sweats or other systemic symptom. Allergies; medications; past medical, surgical, family, and social history; and problem list have been reviewed today and updated as indicated in this encounter seen below. Exam: Left knee shows again a sizable effusion minimally warm nonerythematous. Motion is limited flexion to about 100 degrees by pain. Otherwise knee is well aligned and stable to stress in all directions. Right knee benign today without effusion. Radiographs: Deferred, prior x-rays showing minimal degenerative disease. Kimberlee June was seen today for knee pain. Diagnoses and all orders for this visit:    Pain and swelling of left knee  -     [place] ARTHROCENTESIS ASPIR&/INJ MAJOR JT/BURSA W/O US  -     Cell Count with Differential, Body Fluid; Future  -     Synovial fluid, crystal; Future    Other orders  -     triamcinolone acetonide (KENALOG-40) injection 80 mg  -     bupivacaine (MARCAINE) 0.25 % injection 7.5 mg  -     methylPREDNISolone (MEDROL, AMANDA,) 4 MG tablet; Take by mouth. At the patient's request I performed aspiration of the left knee through a lateral patellar approach sterile technique local anesthesia obtaining almost 60 cc of essentially clear synovial fluid, this was sent for CBC differential and crystal analysis.   Knee was also injected with Kenalog and Marcaine tolerated well no difficulty or complication. He will follow through with his medical evaluations let us know if his left knee pain is refractory otherwise follow-up as needed. Return if symptoms worsen or fail to improve. Current Outpatient Medications   Medication Sig Dispense Refill    pantoprazole (PROTONIX) 40 MG tablet TAKE 1 TABLET BY MOUTH EVERY 12 HOURS      methylPREDNISolone (MEDROL, AMANDA,) 4 MG tablet Take by mouth. 1 kit 0    cyanocobalamin 1000 MCG tablet Take 2,000 mcg by mouth daily      omeprazole (PRILOSEC) 20 MG delayed release capsule Take 1 capsule by mouth daily      atorvastatin (LIPITOR) 80 MG tablet Take 80 mg by mouth daily       fluticasone (FLONASE) 50 MCG/ACT nasal spray 1 spray as needed      ibuprofen (ADVIL;MOTRIN) 200 MG tablet Take 200 mg by mouth as needed      ketoconazole (NIZORAL) 2 % shampoo Apply topically daily      LORazepam (ATIVAN) 0.5 MG tablet Take 0.5 mg by mouth as needed  0    allopurinol (ZYLOPRIM) 300 MG tablet Take 300 mg by mouth daily.  tadalafil (CIALIS) 20 MG tablet  (Patient not taking: Reported on 4/28/2022)      adalimumab (HUMIRA) 40 MG/0.8ML injection Inject 40 mg into the skin every 14 days  (Patient not taking: Reported on 4/28/2022)      OTEZLA 30 MG TABS Take by mouth 2 times daily  (Patient not taking: Reported on 4/28/2022)       No current facility-administered medications for this visit.        Patient Active Problem List   Diagnosis    Gout of both knees    Effusion of right knee    Effusion of left knee joint    PSA (psoriatic arthritis) ([facility] [address]    Chronic idiopathic gout of multiple sites       Past Medical History:   Diagnosis Date    Asthma     seasonal     Gout     Gout     Hyperlipidemia        Past Surgical History:   Procedure Laterality Date    COLONOSCOPY         No Known Allergies    Social History     Socioeconomic History    Marital status:      Spouse name: None    Number of children: None    Years of education: None    Cancer Sister 48         breast [place]         Review of Systems  As follows except as previously noted in HPI:  Constitutional: Negative for chills, diaphoresis, fatigue, fever and unexpected weight change. Respiratory: Negative for cough, shortness of breath and wheezing. Cardiovascular: Negative for chest pain and palpitations. Neurological: Negative for dizziness, syncope, cephalgia. GI / : negative  Musculoskeletal: see HPI       Objective:   Physical Exam   Constitutional: Oriented to person, place, and time. and appears well-developed and well-nourished. :   Head: Normocephalic and atraumatic. Eyes: EOM are normal.   Neck: Neck supple. Cardiovascular: Normal rate and regular rhythm. Pulmonary/Chest: Effort normal. No stridor. No respiratory distress, no wheezes. Abdominal:  No abnormal distension. Neurological: Alert and oriented to person, place, and time. Skin: Skin is warm and dry. Psychiatric: Normal mood and affect.  Behavior is normal. Thought content normal.    2022  4:34 PM

## 2022-04-29 LAB
CRYSTALS, FLUID: NORMAL
SOURCE BODY FLUID: NORMAL

## 2022-06-09 ENCOUNTER — HOSPITAL ENCOUNTER (OUTPATIENT)
Age: 57
Discharge: HOME | End: 2022-06-11

## 2022-06-09 PROCEDURE — 88305 TISSUE EXAM BY PATHOLOGIST: CPT

## 2022-08-31 ENCOUNTER — OFFICE VISIT (OUTPATIENT)
Dept: ORTHOPEDIC SURGERY | Age: 57
End: 2022-08-31
Payer: COMMERCIAL

## 2022-08-31 VITALS — HEIGHT: 73 IN | BODY MASS INDEX: 28.49 KG/M2 | WEIGHT: 215 LBS

## 2022-08-31 DIAGNOSIS — M25.462 PAIN AND SWELLING OF LEFT KNEE: Primary | ICD-10-CM

## 2022-08-31 DIAGNOSIS — M25.561 PAIN AND SWELLING OF RIGHT KNEE: ICD-10-CM

## 2022-08-31 DIAGNOSIS — M25.461 PAIN AND SWELLING OF RIGHT KNEE: ICD-10-CM

## 2022-08-31 DIAGNOSIS — M25.562 PAIN AND SWELLING OF LEFT KNEE: Primary | ICD-10-CM

## 2022-08-31 PROCEDURE — 20610 DRAIN/INJ JOINT/BURSA W/O US: CPT | Performed by: ORTHOPAEDIC SURGERY

## 2022-08-31 RX ORDER — BUPIVACAINE HYDROCHLORIDE 2.5 MG/ML
3 INJECTION, SOLUTION INFILTRATION; PERINEURAL ONCE
Status: COMPLETED | OUTPATIENT
Start: 2022-08-31 | End: 2022-08-31

## 2022-08-31 RX ORDER — TRIAMCINOLONE ACETONIDE 40 MG/ML
80 INJECTION, SUSPENSION INTRA-ARTICULAR; INTRAMUSCULAR ONCE
Status: COMPLETED | OUTPATIENT
Start: 2022-08-31 | End: 2022-08-31

## 2022-08-31 RX ADMIN — BUPIVACAINE HYDROCHLORIDE 7.5 MG: 2.5 INJECTION, SOLUTION INFILTRATION; PERINEURAL at 11:06

## 2022-08-31 RX ADMIN — TRIAMCINOLONE ACETONIDE 80 MG: 40 INJECTION, SUSPENSION INTRA-ARTICULAR; INTRAMUSCULAR at 11:07

## 2022-08-31 NOTE — PROGRESS NOTES
slightly turbid fluid then injected with Kenalog and Marcaine again dosages as noted. Questions asked and answered follow-up as needed. Return if symptoms worsen or fail to improve. Current Outpatient Medications   Medication Sig Dispense Refill    pantoprazole (PROTONIX) 40 MG tablet TAKE 1 TABLET BY MOUTH EVERY 12 HOURS      tadalafil (CIALIS) 20 MG tablet       cyanocobalamin 1000 MCG tablet Take 2,000 mcg by mouth daily      omeprazole (PRILOSEC) 20 MG delayed release capsule Take 1 capsule by mouth daily      adalimumab (HUMIRA) 40 MG/0.8ML injection Inject 40 mg into the skin every 14 days      OTEZLA 30 MG TABS Take by mouth 2 times daily      atorvastatin (LIPITOR) 80 MG tablet Take 80 mg by mouth daily       fluticasone (FLONASE) 50 MCG/ACT nasal spray 1 spray as needed      ibuprofen (ADVIL;MOTRIN) 200 MG tablet Take 200 mg by mouth as needed      ketoconazole (NIZORAL) 2 % shampoo Apply topically daily      LORazepam (ATIVAN) 0.5 MG tablet Take 0.5 mg by mouth as needed  0    allopurinol (ZYLOPRIM) 300 MG tablet Take 300 mg by mouth daily. No current facility-administered medications for this visit.        Patient Active Problem List   Diagnosis    Gout of both knees    Effusion of right knee    Effusion of left knee joint    PSA (psoriatic arthritis) ([facility] [address]    Chronic idiopathic gout of multiple sites       Past Medical History:   Diagnosis Date    Asthma     seasonal     Gout     Gout     Hyperlipidemia        Past Surgical History:   Procedure Laterality Date    COLONOSCOPY         No Known Allergies    Social History     Socioeconomic History    Marital status:      Spouse name: None    Number of children: None    Years of education: None    Highest education level: None   Occupational History     Employer: SELF-EMPLOYED   Tobacco Use    Smoking status: Former     Packs/day: 1.00     Years: 12.00     Pack years: 12.00     Types: Cigarettes     Quit date: 12/28/1984     Years since quittin.6    Smokeless tobacco: Never   Vaping Use    Vaping Use: Never used   Substance and Sexual Activity    Alcohol use: Yes     Comment: social    Drug use: Never    Sexual activity: Yes     Partners: Female       Family History   Problem Relation Age of Onset    Heart Disease Father 50        had rheumatic fever as child ACB     Cancer Sister 48         breast [place]         Review of Systems  As follows except as previously noted in HPI:  Constitutional: Negative for chills, diaphoresis, fatigue, fever and unexpected weight change. Respiratory: Negative for cough, shortness of breath and wheezing. Cardiovascular: Negative for chest pain and palpitations. Neurological: Negative for dizziness, syncope, cephalgia. GI / : negative  Musculoskeletal: see HPI       Objective:   Physical Exam   Constitutional: Oriented to person, place, and time. and appears well-developed and well-nourished. :   Head: Normocephalic and atraumatic. Eyes: EOM are normal.   Neck: Neck supple. Cardiovascular: Normal rate and regular rhythm. Pulmonary/Chest: Effort normal. No stridor. No respiratory distress, no wheezes. Abdominal:  No abnormal distension. Neurological: Alert and oriented to person, place, and time. Skin: Skin is warm and dry. Psychiatric: Normal mood and affect.  Behavior is normal. Thought content normal.    2022  1:29 PM

## 2023-01-03 ENCOUNTER — TELEPHONE (OUTPATIENT)
Dept: ORTHOPEDIC SURGERY | Age: 58
End: 2023-01-03

## 2023-01-03 DIAGNOSIS — M25.522 LEFT ELBOW PAIN: Primary | ICD-10-CM

## 2023-10-12 ENCOUNTER — OFFICE VISIT (OUTPATIENT)
Dept: ORTHOPEDIC SURGERY | Age: 58
End: 2023-10-12

## 2023-10-12 VITALS — HEIGHT: 73 IN | WEIGHT: 215 LBS | BODY MASS INDEX: 28.49 KG/M2

## 2023-10-12 DIAGNOSIS — M25.562 PAIN IN BOTH KNEES, UNSPECIFIED CHRONICITY: Primary | ICD-10-CM

## 2023-10-12 DIAGNOSIS — M25.561 PAIN IN BOTH KNEES, UNSPECIFIED CHRONICITY: Primary | ICD-10-CM

## 2023-10-12 RX ORDER — TRIAMCINOLONE ACETONIDE 40 MG/ML
80 INJECTION, SUSPENSION INTRA-ARTICULAR; INTRAMUSCULAR ONCE
Status: COMPLETED | OUTPATIENT
Start: 2023-10-12 | End: 2023-10-12

## 2023-10-12 RX ORDER — BUPIVACAINE HYDROCHLORIDE 2.5 MG/ML
3 INJECTION, SOLUTION INFILTRATION; PERINEURAL ONCE
Status: COMPLETED | OUTPATIENT
Start: 2023-10-12 | End: 2023-10-12

## 2023-10-12 RX ORDER — CELECOXIB 200 MG/1
CAPSULE ORAL DAILY
COMMUNITY
Start: 2023-09-19

## 2023-10-12 RX ADMIN — TRIAMCINOLONE ACETONIDE 80 MG: 40 INJECTION, SUSPENSION INTRA-ARTICULAR; INTRAMUSCULAR at 10:41

## 2023-10-12 RX ADMIN — BUPIVACAINE HYDROCHLORIDE 7.5 MG: 2.5 INJECTION, SOLUTION INFILTRATION; PERINEURAL at 10:40

## 2023-10-12 NOTE — PROGRESS NOTES
New Knee Patient     Referring Provider:   No referring provider defined for this encounter. CHIEF COMPLAINT:   Chief Complaint   Patient presents with    Knee Pain     Bilateral knee pain. Previous JOLYNN Lepe patient. HPI:    Justin North is a 59y.o. year old male with a 10-year history of bilateral knee pain. He suffers from psoriatic arthritis and has tried multiple biologic agents without relief. He has received knee aspiration and injections with temporary relief. Most recently he had PRP injection into his knees which did not help. He is here today to discuss further treatment. He has knee pain and swelling that comes and goes. Sometimes the pain is worse when his knees get more swollen. He has activity related pain that is sharp and nonradiating. Denies fever and chills. Denies numbness tingling.       PAST MEDICAL HISTORY  Past Medical History:   Diagnosis Date    Asthma     seasonal     Gout     Gout     Hyperlipidemia        PAST SURGICAL HISTORY  Past Surgical History:   Procedure Laterality Date    COLONOSCOPY           FAMILY HISTORY   Family History   Problem Relation Age of Onset    Heart Disease Father 50        had rheumatic fever as child ACB     Cancer Sister 48         breast [place]       SOCIAL HISTORY  Social History     Socioeconomic History    Marital status:      Spouse name: Not on file    Number of children: Not on file    Years of education: Not on file    Highest education level: Not on file   Occupational History     Employer: SELF-EMPLOYED   Tobacco Use    Smoking status: Former     Packs/day: 1.00     Years: 12.00     Additional pack years: 0.00     Total pack years: 12.00     Types: Cigarettes     Quit date: 1984     Years since quittin.8    Smokeless tobacco: Never   Vaping Use    Vaping Use: Never used   Substance and Sexual Activity    Alcohol use: Yes     Comment: social    Drug use: Never    Sexual activity: Yes     Partners:

## 2024-02-08 ENCOUNTER — OFFICE VISIT (OUTPATIENT)
Dept: ORTHOPEDIC SURGERY | Age: 59
End: 2024-02-08

## 2024-02-08 DIAGNOSIS — M25.562 PAIN IN BOTH KNEES, UNSPECIFIED CHRONICITY: Primary | ICD-10-CM

## 2024-02-08 DIAGNOSIS — M25.561 PAIN IN BOTH KNEES, UNSPECIFIED CHRONICITY: Primary | ICD-10-CM

## 2024-02-08 RX ORDER — BUPIVACAINE HYDROCHLORIDE 2.5 MG/ML
3 INJECTION, SOLUTION INFILTRATION; PERINEURAL ONCE
Status: COMPLETED | OUTPATIENT
Start: 2024-02-08 | End: 2024-02-08

## 2024-02-08 RX ORDER — TRIAMCINOLONE ACETONIDE 40 MG/ML
80 INJECTION, SUSPENSION INTRA-ARTICULAR; INTRAMUSCULAR ONCE
Status: COMPLETED | OUTPATIENT
Start: 2024-02-08 | End: 2024-02-08

## 2024-02-08 RX ADMIN — BUPIVACAINE HYDROCHLORIDE 7.5 MG: 2.5 INJECTION, SOLUTION INFILTRATION; PERINEURAL at 09:07

## 2024-02-08 RX ADMIN — TRIAMCINOLONE ACETONIDE 80 MG: 40 INJECTION, SUSPENSION INTRA-ARTICULAR; INTRAMUSCULAR at 09:07

## 2024-02-08 RX ADMIN — TRIAMCINOLONE ACETONIDE 80 MG: 40 INJECTION, SUSPENSION INTRA-ARTICULAR; INTRAMUSCULAR at 09:08

## 2024-02-08 NOTE — PROGRESS NOTES
Knees are stable to varus valgus stress testing at 0 30 degrees.  Negative Lachman.  Negative posterior drawer           IMAGING:    XR: 4 views of the bilateral knees demonstrate mild tricompartmental osteoarthritis.  Is most pronounced in medial compartment.  Overall alignment is well-maintained.  No new imaging    ASSESSMENT  Bilateral knee pain, mild osteoarthritis  History of psoriatic arthritis    PLAN  -Aspiration and injection provided today.  He is still considering stem cell injections.  Follow-up as needed.      Bilateral knee aspirations and injections    The bilateral knees were identified as a site of aspiration.  Risk-benefit alternatives of procedure were discussed and informed sent was obtained verbally.  Through a superior lateral approach both knees were prepped with Betadine.  An 18-gauge needle was inserted in the suprapatellar patellar pouch and 90 cc of clear synovial fluid was aspirated from the left knee and 60 from the right knee.  Through the same needle was 80 mg of Kenalog and 3 mL of 0.25% Marcaine were injected in the knees.  Sterile dressings were applied.  He tolerated the procedure well.              Willem Marrero DO   Orthopaedic Surgery   2/8/24  8:53 AM    Note: This report was completed using [employer] voiced recognition software.  Every effort has been made to ensure accuracy; however, inadvertent computerized transcription errors may be present.

## 2024-05-22 ENCOUNTER — OFFICE VISIT (OUTPATIENT)
Dept: ORTHOPEDIC SURGERY | Age: 59
End: 2024-05-22
Payer: COMMERCIAL

## 2024-05-22 DIAGNOSIS — M25.562 PAIN IN BOTH KNEES, UNSPECIFIED CHRONICITY: Primary | ICD-10-CM

## 2024-05-22 DIAGNOSIS — M25.561 PAIN IN BOTH KNEES, UNSPECIFIED CHRONICITY: Primary | ICD-10-CM

## 2024-05-22 PROCEDURE — 20610 DRAIN/INJ JOINT/BURSA W/O US: CPT | Performed by: STUDENT IN AN ORGANIZED HEALTH CARE EDUCATION/TRAINING PROGRAM

## 2024-05-22 PROCEDURE — 99212 OFFICE O/P EST SF 10 MIN: CPT | Performed by: STUDENT IN AN ORGANIZED HEALTH CARE EDUCATION/TRAINING PROGRAM

## 2024-05-22 PROCEDURE — 1036F TOBACCO NON-USER: CPT | Performed by: STUDENT IN AN ORGANIZED HEALTH CARE EDUCATION/TRAINING PROGRAM

## 2024-05-22 PROCEDURE — 3017F COLORECTAL CA SCREEN DOC REV: CPT | Performed by: STUDENT IN AN ORGANIZED HEALTH CARE EDUCATION/TRAINING PROGRAM

## 2024-05-22 PROCEDURE — G8419 CALC BMI OUT NRM PARAM NOF/U: HCPCS | Performed by: STUDENT IN AN ORGANIZED HEALTH CARE EDUCATION/TRAINING PROGRAM

## 2024-05-22 PROCEDURE — G8427 DOCREV CUR MEDS BY ELIG CLIN: HCPCS | Performed by: STUDENT IN AN ORGANIZED HEALTH CARE EDUCATION/TRAINING PROGRAM

## 2024-05-22 RX ORDER — TRIAMCINOLONE ACETONIDE 40 MG/ML
80 INJECTION, SUSPENSION INTRA-ARTICULAR; INTRAMUSCULAR ONCE
Status: COMPLETED | OUTPATIENT
Start: 2024-05-22 | End: 2024-05-22

## 2024-05-22 RX ORDER — BUPIVACAINE HYDROCHLORIDE 2.5 MG/ML
3 INJECTION, SOLUTION INFILTRATION; PERINEURAL ONCE
Status: COMPLETED | OUTPATIENT
Start: 2024-05-22 | End: 2024-05-22

## 2024-05-22 RX ADMIN — TRIAMCINOLONE ACETONIDE 80 MG: 40 INJECTION, SUSPENSION INTRA-ARTICULAR; INTRAMUSCULAR at 10:25

## 2024-05-22 RX ADMIN — BUPIVACAINE HYDROCHLORIDE 7.5 MG: 2.5 INJECTION, SOLUTION INFILTRATION; PERINEURAL at 10:24

## 2024-05-22 RX ADMIN — BUPIVACAINE HYDROCHLORIDE 7.5 MG: 2.5 INJECTION, SOLUTION INFILTRATION; PERINEURAL at 10:23

## 2024-05-22 RX ADMIN — TRIAMCINOLONE ACETONIDE 80 MG: 40 INJECTION, SUSPENSION INTRA-ARTICULAR; INTRAMUSCULAR at 10:24

## 2024-05-22 NOTE — PROGRESS NOTES
Disp: , Rfl:     adalimumab (HUMIRA) 40 MG/0.8ML injection, Inject 40 mg into the skin every 14 days (Patient not taking: Reported on 10/12/2023), Disp: , Rfl:     OTEZLA 30 MG TABS, Take by mouth 2 times daily (Patient not taking: Reported on 10/12/2023), Disp: , Rfl:     atorvastatin (LIPITOR) 80 MG tablet, Take 80 mg by mouth daily  (Patient not taking: Reported on 10/12/2023), Disp: , Rfl:     fluticasone (FLONASE) 50 MCG/ACT nasal spray, 1 spray as needed, Disp: , Rfl:     ibuprofen (ADVIL;MOTRIN) 200 MG tablet, Take 1 tablet by mouth as needed, Disp: , Rfl:     ketoconazole (NIZORAL) 2 % shampoo, Apply topically daily, Disp: , Rfl:     LORazepam (ATIVAN) 0.5 MG tablet, Take 1 tablet by mouth as needed., Disp: , Rfl: 0    allopurinol (ZYLOPRIM) 300 MG tablet, Take 1 tablet by mouth daily, Disp: , Rfl:     ALLERGIES  No Known Allergies    Controlled Substances Monitoring:          REVIEW OF SYSTEMS:     Constitutional:  Negative for weight loss, fevers, chills, fatigue  Cardiovascular: Negative for chest pain, palpitations  Pulmonary: Negative for shortness of breath, labored breathing, cough  GI: negative for abdominal pain, nausea, vomitting   MSK: per HPI  Skin: negative for rash, open wounds    All other systems reviewed and are negative         PHYSICAL EXAM     There were no vitals filed for this visit.      Height:    Weight: [unfilled]  BMI:  There is no height or weight on file to calculate BMI.    General: The patient is alert and oriented x 3, appears to be stated age and in no distress.   HEENT: head is normocephalic, atraumatic.  EOMI.   Neck: supple, trachea midline, no thyromegaly   Cardiovascular: peripheral pulses palpable.  Normal Capillary refill   Respiratory: breathing unlabored, chest expansion symmetric   Skin: no rash, no open wounds, no erythema  Psych: normal affect; mood stable  Neurologic: gait normal, sensation grossly intact in extremities  MSK:        Lower Extremity:

## 2024-07-02 ENCOUNTER — HOSPITAL ENCOUNTER (OUTPATIENT)
Age: 59
Discharge: HOME | End: 2024-07-02
Payer: COMMERCIAL

## 2024-07-02 LAB
ALBUMIN SERPL-MCNC: 4 G/DL (ref 3.5–5.2)
ALP SERPL-CCNC: 57 U/L (ref 40–129)
ALT SERPL-CCNC: 13 U/L (ref 0–40)
ANION GAP SERPL CALCULATED.3IONS-SCNC: 11 MMOL/L (ref 7–16)
AST SERPL-CCNC: 15 U/L (ref 0–39)
BASOPHILS # BLD: 0.02 K/UL (ref 0–0.2)
BASOPHILS NFR BLD: 0 % (ref 0–2)
BILIRUB SERPL-MCNC: 0.7 MG/DL (ref 0–1.2)
BUN SERPL-MCNC: 13 MG/DL (ref 6–23)
CALCIUM SERPL-MCNC: 8.9 MG/DL (ref 8.6–10.2)
CHLORIDE SERPL-SCNC: 102 MMOL/L (ref 98–107)
CHOLEST SERPL-MCNC: 280 MG/DL
CO2 SERPL-SCNC: 26 MMOL/L (ref 22–29)
CREAT SERPL-MCNC: 1.1 MG/DL (ref 0.7–1.2)
EOSINOPHIL # BLD: 0.07 K/UL (ref 0.05–0.5)
EOSINOPHILS RELATIVE PERCENT: 1 % (ref 0–6)
ERYTHROCYTE [DISTWIDTH] IN BLOOD BY AUTOMATED COUNT: 12.2 % (ref 11.5–15)
GFR, ESTIMATED: 80 ML/MIN/1.73M2
GLUCOSE SERPL-MCNC: 91 MG/DL (ref 74–99)
HCT VFR BLD AUTO: 46.4 % (ref 37–54)
HDLC SERPL-MCNC: 68 MG/DL
HGB BLD-MCNC: 16.3 G/DL (ref 12.5–16.5)
IMM GRANULOCYTES # BLD AUTO: <0.03 K/UL (ref 0–0.58)
IMM GRANULOCYTES NFR BLD: 0 % (ref 0–5)
LDLC SERPL CALC-MCNC: 188 MG/DL
LYMPHOCYTES NFR BLD: 1.47 K/UL (ref 1.5–4)
LYMPHOCYTES RELATIVE PERCENT: 23 % (ref 20–42)
MCH RBC QN AUTO: 30.9 PG (ref 26–35)
MCHC RBC AUTO-ENTMCNC: 35.1 G/DL (ref 32–34.5)
MCV RBC AUTO: 87.9 FL (ref 80–99.9)
MONOCYTES NFR BLD: 0.61 K/UL (ref 0.1–0.95)
MONOCYTES NFR BLD: 9 % (ref 2–12)
NEUTROPHILS NFR BLD: 66 % (ref 43–80)
NEUTS SEG NFR BLD: 4.28 K/UL (ref 1.8–7.3)
PLATELET # BLD AUTO: 262 K/UL (ref 130–450)
PMV BLD AUTO: 11.1 FL (ref 7–12)
POTASSIUM SERPL-SCNC: 4.1 MMOL/L (ref 3.5–5)
PROT SERPL-MCNC: 6.9 G/DL (ref 6.4–8.3)
PSA SERPL-MCNC: 0.33 NG/ML (ref 0–4)
RBC # BLD AUTO: 5.28 M/UL (ref 3.8–5.8)
SODIUM SERPL-SCNC: 139 MMOL/L (ref 132–146)
TRIGL SERPL-MCNC: 118 MG/DL
URATE SERPL-MCNC: 6.9 MG/DL (ref 3.4–7)
VLDLC SERPL CALC-MCNC: 24 MG/DL
WBC OTHER # BLD: 6.5 K/UL (ref 4.5–11.5)

## 2024-07-02 PROCEDURE — 80061 LIPID PANEL: CPT

## 2024-07-02 PROCEDURE — G0103 PSA SCREENING: HCPCS

## 2024-07-02 PROCEDURE — 80053 COMPREHEN METABOLIC PANEL: CPT

## 2024-07-02 PROCEDURE — 84550 ASSAY OF BLOOD/URIC ACID: CPT

## 2024-07-02 PROCEDURE — 36415 COLL VENOUS BLD VENIPUNCTURE: CPT

## 2024-07-02 PROCEDURE — 85025 COMPLETE CBC W/AUTO DIFF WBC: CPT

## 2024-08-08 ENCOUNTER — OFFICE VISIT (OUTPATIENT)
Dept: ORTHOPEDIC SURGERY | Age: 59
End: 2024-08-08

## 2024-08-08 DIAGNOSIS — M25.512 ACUTE PAIN OF LEFT SHOULDER: Primary | ICD-10-CM

## 2024-08-08 RX ORDER — BUPIVACAINE HYDROCHLORIDE 2.5 MG/ML
2 INJECTION, SOLUTION INFILTRATION; PERINEURAL ONCE
Status: COMPLETED | OUTPATIENT
Start: 2024-08-08 | End: 2024-08-08

## 2024-08-08 RX ORDER — TRIAMCINOLONE ACETONIDE 40 MG/ML
40 INJECTION, SUSPENSION INTRA-ARTICULAR; INTRAMUSCULAR ONCE
Status: COMPLETED | OUTPATIENT
Start: 2024-08-08 | End: 2024-08-08

## 2024-08-08 RX ADMIN — TRIAMCINOLONE ACETONIDE 40 MG: 40 INJECTION, SUSPENSION INTRA-ARTICULAR; INTRAMUSCULAR at 13:40

## 2024-08-08 RX ADMIN — BUPIVACAINE HYDROCHLORIDE 5 MG: 2.5 INJECTION, SOLUTION INFILTRATION; PERINEURAL at 13:39

## 2024-08-08 NOTE — PROGRESS NOTES
New Shoulder Patient Visit     Referring Provider:   No referring provider defined for this encounter.    CHIEF COMPLAINT:   Chief Complaint   Patient presents with    Shoulder Pain     Left shoulder pain. Had CSI years ago.        HPI:      Rocco Forrest is a 59 y.o. year old male patient well-known to me with a history of inflammatory arthritis.  I have seen him for his knees multiple times.  He is here today for left shoulder pain.  This is been going on for a number of years.  He had an injection about 10 years ago which provided significant lasting relief but over the last few months is gotten worse.  Worse with overhead activity and and at night.  His chiropractor work on her shoulder however it did not provide complete relief and he is here today for further treatment.  Pain is sharp and nonradiating.  Denies fever and chills.  Denies numbness tingling.    PAST MEDICAL HISTORY  Past Medical History:   Diagnosis Date    Asthma     seasonal     Gout     Gout     Hyperlipidemia        PAST SURGICAL HISTORY  Past Surgical History:   Procedure Laterality Date    COLONOSCOPY         FAMILY HISTORY   Family History   Problem Relation Age of Onset    Heart Disease Father 48        had rheumatic fever as child ACB     Cancer Sister 53         breast [place]       SOCIAL HISTORY  Social History     Occupational History     Employer: SELF-EMPLOYED   Tobacco Use    Smoking status: Former     Current packs/day: 0.00     Average packs/day: 1 pack/day for 12.0 years (12.0 ttl pk-yrs)     Types: Cigarettes     Start date: 1972     Quit date: 1984     Years since quittin.6    Smokeless tobacco: Never   Vaping Use    Vaping Use: Never used   Substance and Sexual Activity    Alcohol use: Yes     Comment: social    Drug use: Never    Sexual activity: Yes     Partners: Female       CURRENT MEDICATIONS     Current Outpatient Medications:     celecoxib (CELEBREX) 200 MG capsule, Take by mouth daily, Disp: ,

## 2024-12-05 ENCOUNTER — OFFICE VISIT (OUTPATIENT)
Dept: ORTHOPEDIC SURGERY | Age: 59
End: 2024-12-05

## 2024-12-05 VITALS
DIASTOLIC BLOOD PRESSURE: 77 MMHG | TEMPERATURE: 97.7 F | SYSTOLIC BLOOD PRESSURE: 149 MMHG | OXYGEN SATURATION: 98 % | HEART RATE: 64 BPM

## 2024-12-05 DIAGNOSIS — M17.0 BILATERAL PRIMARY OSTEOARTHRITIS OF KNEE: ICD-10-CM

## 2024-12-05 DIAGNOSIS — L40.50 PSA (PSORIATIC ARTHRITIS) (HCC): ICD-10-CM

## 2024-12-05 DIAGNOSIS — M25.562 PAIN IN BOTH KNEES, UNSPECIFIED CHRONICITY: Primary | ICD-10-CM

## 2024-12-05 DIAGNOSIS — M19.132 SLAC (SCAPHOLUNATE ADVANCED COLLAPSE) OF WRIST, LEFT: ICD-10-CM

## 2024-12-05 DIAGNOSIS — M25.561 PAIN IN BOTH KNEES, UNSPECIFIED CHRONICITY: Primary | ICD-10-CM

## 2024-12-05 RX ORDER — TRIAMCINOLONE ACETONIDE 40 MG/ML
80 INJECTION, SUSPENSION INTRA-ARTICULAR; INTRAMUSCULAR ONCE
Status: COMPLETED | OUTPATIENT
Start: 2024-12-05 | End: 2024-12-05

## 2024-12-05 RX ORDER — BUPIVACAINE HYDROCHLORIDE 2.5 MG/ML
3 INJECTION, SOLUTION INFILTRATION; PERINEURAL ONCE
Status: COMPLETED | OUTPATIENT
Start: 2024-12-05 | End: 2024-12-05

## 2024-12-05 RX ADMIN — BUPIVACAINE HYDROCHLORIDE 7.5 MG: 2.5 INJECTION, SOLUTION INFILTRATION; PERINEURAL at 11:25

## 2024-12-05 RX ADMIN — TRIAMCINOLONE ACETONIDE 80 MG: 40 INJECTION, SUSPENSION INTRA-ARTICULAR; INTRAMUSCULAR at 11:26

## 2024-12-05 NOTE — ADDENDUM NOTE
Addended by: MATHEUS MARTINEZ on: 12/5/2024 11:29 AM     Modules accepted: Orders     PMI and heart sounds localize heart on left side of chest/Pulse with normal variation, frequency and intensity (amplitude & strength) with equal intensity on upper and lower extremities/Blood pressure value(s) are adequate

## 2024-12-05 NOTE — PROGRESS NOTES
CHIEF COMPLAINT:   Chief Complaint   Patient presents with    Knee Pain     Bilateral knee pain had CSI 5/22/24 with relief until one week ago. Would like to repeat today      HPI update 12/5/2024: Rocco is here today for bilateral knee pain.  His injections from May provided relief until 1 week ago.  This is significant duration of symptom relief.  They recently flared up and he is having increasing pain and swelling in his knees.  He would like to repeat the injections today.  He is also complaining of left wrist pain and brought x-rays on a disk for me to review    HPI update 5/22/2024: He is here today for bilateral knee injections.  At his last visit I aspirated his knees and injected him and provided good relief for at least 4 months.  He would like to repeat these today    HPI update 2/8/2024: Rocco is here today for repeat knee aspiration and injection.  He got good relief from this treatment in October.  Denies fever and chills.  Denies numbness tingling.  He has been seen in clinic for psoriatic arthritis and taking a low-dose Narcan.  He is still considering going to [place] to try stem cell injections in his knee    HPI:    Rocco Forrest is a 59 y.o. year old male with a 10-year history of bilateral knee pain.  He suffers from psoriatic arthritis and has tried multiple biologic agents without relief.  He has received knee aspiration and injections with temporary relief.  Most recently he had PRP injection into his knees which did not help.  He is here today to discuss further treatment.  He has knee pain and swelling that comes and goes.  Sometimes the pain is worse when his knees get more swollen.  He has activity related pain that is sharp and nonradiating.  Denies fever and chills.  Denies numbness tingling.      PAST MEDICAL HISTORY  Past Medical History:   Diagnosis Date    Asthma     seasonal     Gout     Gout     Hyperlipidemia        PAST SURGICAL HISTORY  Past Surgical History:

## 2025-01-23 NOTE — PROGRESS NOTES
[facility] RHEUMATOLOGY  [address] [place] [id number]  Dept: [phone]  Dept Fax: [phone]    Rocco Forrest 1965 is a 59 y.o. male, here for evaluation of the following chief complaint(s): New Patient (Psoriatic Arthritis)      Subjective   SUBJECTIVE/OBJECTIVE:    HPI: Rocco Forrest is a 59 y.o. male with a history of HLD, PsA (previously followed at [facility]), crystal-proven non-tophaceous gout referred by ORS for PsA. Based on [facility] note 04/2022, joint symptoms began ~ 2012, treated with Otezla, Humira; no MTX d/t EtOH use; pt wary of treatment. Gout well-controlled on allopurinol 300mg daily at that time. Patient follows with ORS and gets recurrent CSI to the knees. No recent pertinent labs. XR b/l knees 12/2024 w/mild-mod degenerative changes including lateral compartment narrowing particularly of the R knee.    Today patient states primary concern is L wrist pain that occurred after a fall last year. Previously dx w/PsA and had recurrent b/l knee effusions treated w/regular arthrocentesis and CSI. Stopped taking atorvastatin and since then feels immensely better, no knee pain or effusions. Only joint pain currently is the wrist. Feels stiff for ~ 20 mins in the AM. Pain worse w/activity. No dactylitis, enthesitis, tendinopathies. Lost 20lbs recently. No active PsO, started using a different bar soap and skin completely cleared. He stopped allopurinol and has had no gout flares. Previously had podagra.     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

## 2025-01-27 ENCOUNTER — OFFICE VISIT (OUTPATIENT)
Dept: RHEUMATOLOGY | Age: 60
End: 2025-01-27
Payer: COMMERCIAL

## 2025-01-27 VITALS
HEIGHT: 73 IN | HEART RATE: 78 BPM | OXYGEN SATURATION: 97 % | WEIGHT: 200 LBS | BODY MASS INDEX: 26.51 KG/M2 | SYSTOLIC BLOOD PRESSURE: 166 MMHG | DIASTOLIC BLOOD PRESSURE: 93 MMHG

## 2025-01-27 DIAGNOSIS — M19.232 OTHER SECONDARY OSTEOARTHRITIS OF LEFT WRIST: Primary | ICD-10-CM

## 2025-01-27 DIAGNOSIS — L40.50 PSA (PSORIATIC ARTHRITIS) (HCC): ICD-10-CM

## 2025-01-27 DIAGNOSIS — M1A.09X0 IDIOPATHIC CHRONIC GOUT OF MULTIPLE SITES WITHOUT TOPHUS: ICD-10-CM

## 2025-01-27 PROCEDURE — G8427 DOCREV CUR MEDS BY ELIG CLIN: HCPCS | Performed by: STUDENT IN AN ORGANIZED HEALTH CARE EDUCATION/TRAINING PROGRAM

## 2025-01-27 PROCEDURE — 99204 OFFICE O/P NEW MOD 45 MIN: CPT | Performed by: STUDENT IN AN ORGANIZED HEALTH CARE EDUCATION/TRAINING PROGRAM

## 2025-01-27 PROCEDURE — 3017F COLORECTAL CA SCREEN DOC REV: CPT | Performed by: STUDENT IN AN ORGANIZED HEALTH CARE EDUCATION/TRAINING PROGRAM

## 2025-01-27 PROCEDURE — G8419 CALC BMI OUT NRM PARAM NOF/U: HCPCS | Performed by: STUDENT IN AN ORGANIZED HEALTH CARE EDUCATION/TRAINING PROGRAM

## 2025-01-27 PROCEDURE — 1036F TOBACCO NON-USER: CPT | Performed by: STUDENT IN AN ORGANIZED HEALTH CARE EDUCATION/TRAINING PROGRAM

## 2025-01-27 NOTE — PROGRESS NOTES
Rocco is here today as a new patient for Psoriatic Arthritis.  His symptoms today are pain to the wrists.  He reports taking Enbrel and Humira and they didn't work.  He states the Otezla did not help.  He has been using \"[employer] Bar Soap\" and it works very well.  He also reports stopping his statin and feels much better, lost weight.

## 2025-03-24 ENCOUNTER — OFFICE VISIT (OUTPATIENT)
Dept: ORTHOPEDIC SURGERY | Age: 60
End: 2025-03-24
Payer: COMMERCIAL

## 2025-03-24 VITALS
SYSTOLIC BLOOD PRESSURE: 133 MMHG | DIASTOLIC BLOOD PRESSURE: 92 MMHG | RESPIRATION RATE: 16 BRPM | TEMPERATURE: 98.7 F | OXYGEN SATURATION: 96 % | HEART RATE: 57 BPM

## 2025-03-24 DIAGNOSIS — M75.52 SUBACROMIAL BURSITIS OF LEFT SHOULDER JOINT: ICD-10-CM

## 2025-03-24 DIAGNOSIS — M25.512 ACUTE PAIN OF LEFT SHOULDER: Primary | ICD-10-CM

## 2025-03-24 PROCEDURE — 1036F TOBACCO NON-USER: CPT

## 2025-03-24 PROCEDURE — 99213 OFFICE O/P EST LOW 20 MIN: CPT

## 2025-03-24 PROCEDURE — 20610 DRAIN/INJ JOINT/BURSA W/O US: CPT

## 2025-03-24 PROCEDURE — 3017F COLORECTAL CA SCREEN DOC REV: CPT

## 2025-03-24 PROCEDURE — G8427 DOCREV CUR MEDS BY ELIG CLIN: HCPCS

## 2025-03-24 PROCEDURE — G8419 CALC BMI OUT NRM PARAM NOF/U: HCPCS

## 2025-03-24 RX ORDER — METHYLPREDNISOLONE 4 MG/1
TABLET ORAL
Qty: 21 TABLET | Refills: 0 | Status: SHIPPED | OUTPATIENT
Start: 2025-03-24 | End: 2025-03-30

## 2025-03-24 RX ORDER — TRIAMCINOLONE ACETONIDE 40 MG/ML
40 INJECTION, SUSPENSION INTRA-ARTICULAR; INTRAMUSCULAR ONCE
Status: COMPLETED | OUTPATIENT
Start: 2025-03-24 | End: 2025-03-24

## 2025-03-24 RX ORDER — BUPIVACAINE HYDROCHLORIDE 2.5 MG/ML
2 INJECTION, SOLUTION INFILTRATION; PERINEURAL ONCE
Status: COMPLETED | OUTPATIENT
Start: 2025-03-24 | End: 2025-03-24

## 2025-03-24 RX ADMIN — TRIAMCINOLONE ACETONIDE 40 MG: 40 INJECTION, SUSPENSION INTRA-ARTICULAR; INTRAMUSCULAR at 10:21

## 2025-03-24 RX ADMIN — BUPIVACAINE HYDROCHLORIDE 5 MG: 2.5 INJECTION, SOLUTION INFILTRATION; PERINEURAL at 10:21

## 2025-03-24 NOTE — PROGRESS NOTES
Referring Provider:   No referring provider defined for this encounter.    CHIEF COMPLAINT:   Chief Complaint   Patient presents with    Follow-up     Left shoulder pain, he wants an injection last csi was 24        HPI update 3/24/2025: Rocco is here today for follow up of the left shoulder. He had an injection in August which provided him relief until 3 weeks ago after he has a massage. He would like to repeat this again today. He denies any change to range of motion. Denies any new injuries, numbness or tingling.     HPI:      Rocco Forrest is a 60 y.o. year old male patient well-known to me with a history of inflammatory arthritis.  I have seen him for his knees multiple times.  He is here today for left shoulder pain.  This is been going on for a number of years.  He had an injection about 10 years ago which provided significant lasting relief but over the last few months is gotten worse.  Worse with overhead activity and and at night.  His chiropractor work on her shoulder however it did not provide complete relief and he is here today for further treatment.  Pain is sharp and nonradiating.  Denies fever and chills.  Denies numbness tingling.    PAST MEDICAL HISTORY  Past Medical History:   Diagnosis Date    Asthma     seasonal     Gout     Gout     Hyperlipidemia        PAST SURGICAL HISTORY  Past Surgical History:   Procedure Laterality Date    COLONOSCOPY         FAMILY HISTORY   Family History   Problem Relation Age of Onset    Heart Disease Father 48        had rheumatic fever as child ACB     Cancer Sister 53         breast [place]       SOCIAL HISTORY  Social History     Occupational History     Employer: SELF-EMPLOYED   Tobacco Use    Smoking status: Former     Current packs/day: 0.00     Average packs/day: 1 pack/day for 12.0 years (12.0 ttl pk-yrs)     Types: Cigarettes     Start date: 1972     Quit date: 1984     Years since quittin.2    Smokeless tobacco: Never

## 2025-05-01 ENCOUNTER — OFFICE VISIT (OUTPATIENT)
Dept: ORTHOPEDIC SURGERY | Age: 60
End: 2025-05-01
Payer: COMMERCIAL

## 2025-05-01 VITALS
DIASTOLIC BLOOD PRESSURE: 85 MMHG | SYSTOLIC BLOOD PRESSURE: 147 MMHG | TEMPERATURE: 98 F | OXYGEN SATURATION: 100 % | HEART RATE: 68 BPM

## 2025-05-01 DIAGNOSIS — M25.512 ACUTE PAIN OF LEFT SHOULDER: Primary | ICD-10-CM

## 2025-05-01 PROCEDURE — G8427 DOCREV CUR MEDS BY ELIG CLIN: HCPCS | Performed by: STUDENT IN AN ORGANIZED HEALTH CARE EDUCATION/TRAINING PROGRAM

## 2025-05-01 PROCEDURE — 3017F COLORECTAL CA SCREEN DOC REV: CPT | Performed by: STUDENT IN AN ORGANIZED HEALTH CARE EDUCATION/TRAINING PROGRAM

## 2025-05-01 PROCEDURE — 1036F TOBACCO NON-USER: CPT | Performed by: STUDENT IN AN ORGANIZED HEALTH CARE EDUCATION/TRAINING PROGRAM

## 2025-05-01 PROCEDURE — G8419 CALC BMI OUT NRM PARAM NOF/U: HCPCS | Performed by: STUDENT IN AN ORGANIZED HEALTH CARE EDUCATION/TRAINING PROGRAM

## 2025-05-01 PROCEDURE — 99214 OFFICE O/P EST MOD 30 MIN: CPT | Performed by: STUDENT IN AN ORGANIZED HEALTH CARE EDUCATION/TRAINING PROGRAM

## 2025-05-01 RX ORDER — PREDNISONE 10 MG/1
10 TABLET ORAL DAILY
Qty: 3 TABLET | Refills: 0 | Status: SHIPPED | OUTPATIENT
Start: 2025-05-01 | End: 2025-05-04

## 2025-05-01 RX ORDER — PREDNISONE 5 MG/1
5 TABLET ORAL DAILY
Qty: 3 TABLET | Refills: 0 | Status: SHIPPED | OUTPATIENT
Start: 2025-05-01 | End: 2025-05-04

## 2025-05-01 NOTE — PROGRESS NOTES
CHIEF COMPLAINT:   Chief Complaint   Patient presents with    Shoulder Pain     Left shoulder pain, CSI 3/24/25 provided no relief        HPI update 2025: Rocco is here today for left shoulder pain.  His injection on 3/24/2025 provided no relief.  Significant pain at night with overhead activity.  He has recently started playing handball again and is having weakness in his shoulder.    HPI:      Rocco Forrest is a 60 y.o. year old male patient well-known to me with a history of inflammatory arthritis.  I have seen him for his knees multiple times.  He is here today for left shoulder pain.  This is been going on for a number of years.  He had an injection about 10 years ago which provided significant lasting relief but over the last few months is gotten worse.  Worse with overhead activity and and at night.  His chiropractor work on her shoulder however it did not provide complete relief and he is here today for further treatment.  Pain is sharp and nonradiating.  Denies fever and chills.  Denies numbness tingling.    PAST MEDICAL HISTORY  Past Medical History:   Diagnosis Date    Asthma     seasonal     Gout     Gout     Hyperlipidemia        PAST SURGICAL HISTORY  Past Surgical History:   Procedure Laterality Date    COLONOSCOPY         FAMILY HISTORY   Family History   Problem Relation Age of Onset    Heart Disease Father 48        had rheumatic fever as child ACB     Cancer Sister 53         breast [place]       SOCIAL HISTORY  Social History     Occupational History     Employer: SELF-EMPLOYED   Tobacco Use    Smoking status: Former     Current packs/day: 0.00     Average packs/day: 1 pack/day for 12.0 years (12.0 ttl pk-yrs)     Types: Cigarettes     Start date: 1972     Quit date: 1984     Years since quittin.3    Smokeless tobacco: Never   Vaping Use    Vaping status: Never Used   Substance and Sexual Activity    Alcohol use: Yes     Comment: social    Drug use: Never    Sexual

## 2025-05-27 ENCOUNTER — HOSPITAL ENCOUNTER (OUTPATIENT)
Dept: MRI IMAGING | Age: 60
Discharge: HOME | End: 2025-05-29
Attending: STUDENT IN AN ORGANIZED HEALTH CARE EDUCATION/TRAINING PROGRAM
Payer: COMMERCIAL

## 2025-05-27 DIAGNOSIS — M25.512 ACUTE PAIN OF LEFT SHOULDER: ICD-10-CM

## 2025-05-27 PROCEDURE — 73221 MRI JOINT UPR EXTREM W/O DYE: CPT
